# Patient Record
Sex: FEMALE | Race: ASIAN | NOT HISPANIC OR LATINO | Employment: FULL TIME | URBAN - METROPOLITAN AREA
[De-identification: names, ages, dates, MRNs, and addresses within clinical notes are randomized per-mention and may not be internally consistent; named-entity substitution may affect disease eponyms.]

---

## 2017-01-09 ENCOUNTER — PATIENT OUTREACH (OUTPATIENT)
Dept: ADMINISTRATIVE | Facility: HOSPITAL | Age: 32
End: 2017-01-09
Payer: MEDICAID

## 2017-01-23 ENCOUNTER — OFFICE VISIT (OUTPATIENT)
Dept: FAMILY MEDICINE | Facility: CLINIC | Age: 32
End: 2017-01-23
Payer: MEDICAID

## 2017-01-23 ENCOUNTER — CLINICAL SUPPORT (OUTPATIENT)
Dept: CARDIOLOGY | Facility: CLINIC | Age: 32
End: 2017-01-23
Payer: MEDICAID

## 2017-01-23 VITALS
DIASTOLIC BLOOD PRESSURE: 64 MMHG | WEIGHT: 116.5 LBS | TEMPERATURE: 99 F | SYSTOLIC BLOOD PRESSURE: 98 MMHG | BODY MASS INDEX: 21.99 KG/M2 | HEIGHT: 61 IN | HEART RATE: 80 BPM

## 2017-01-23 DIAGNOSIS — Z00.00 ROUTINE HEALTH MAINTENANCE: ICD-10-CM

## 2017-01-23 DIAGNOSIS — F43.0 STRESS REACTION: Primary | ICD-10-CM

## 2017-01-23 LAB
25(OH)D3+25(OH)D2 SERPL-MCNC: 19 NG/ML
ALBUMIN SERPL BCP-MCNC: 4.2 G/DL
ALP SERPL-CCNC: 50 U/L
ALT SERPL W/O P-5'-P-CCNC: 16 U/L
ANION GAP SERPL CALC-SCNC: 8 MMOL/L
AST SERPL-CCNC: 17 U/L
BASOPHILS # BLD AUTO: 0.06 K/UL
BASOPHILS NFR BLD: 1.1 %
BILIRUB SERPL-MCNC: 1.4 MG/DL
BILIRUB UR QL STRIP: NEGATIVE
BUN SERPL-MCNC: 10 MG/DL
CALCIUM SERPL-MCNC: 9.6 MG/DL
CHLORIDE SERPL-SCNC: 109 MMOL/L
CHOLEST/HDLC SERPL: 2.4 {RATIO}
CLARITY UR REFRACT.AUTO: CLEAR
CO2 SERPL-SCNC: 26 MMOL/L
COLOR UR AUTO: YELLOW
CREAT SERPL-MCNC: 0.8 MG/DL
DIFFERENTIAL METHOD: ABNORMAL
EOSINOPHIL # BLD AUTO: 0.2 K/UL
EOSINOPHIL NFR BLD: 2.8 %
ERYTHROCYTE [DISTWIDTH] IN BLOOD BY AUTOMATED COUNT: 11.8 %
EST. GFR  (AFRICAN AMERICAN): >60 ML/MIN/1.73 M^2
EST. GFR  (NON AFRICAN AMERICAN): >60 ML/MIN/1.73 M^2
GLUCOSE SERPL-MCNC: 96 MG/DL
GLUCOSE UR QL STRIP: NEGATIVE
HCT VFR BLD AUTO: 40.2 %
HDL/CHOLESTEROL RATIO: 42.3 %
HDLC SERPL-MCNC: 149 MG/DL
HDLC SERPL-MCNC: 63 MG/DL
HGB BLD-MCNC: 13.9 G/DL
HGB UR QL STRIP: ABNORMAL
KETONES UR QL STRIP: NEGATIVE
LDLC SERPL CALC-MCNC: 75.6 MG/DL
LEUKOCYTE ESTERASE UR QL STRIP: NEGATIVE
LYMPHOCYTES # BLD AUTO: 1.8 K/UL
LYMPHOCYTES NFR BLD: 31.9 %
MCH RBC QN AUTO: 31.3 PG
MCHC RBC AUTO-ENTMCNC: 34.6 %
MCV RBC AUTO: 91 FL
MONOCYTES # BLD AUTO: 0.3 K/UL
MONOCYTES NFR BLD: 5.3 %
NEUTROPHILS # BLD AUTO: 3.3 K/UL
NEUTROPHILS NFR BLD: 58.7 %
NITRITE UR QL STRIP: NEGATIVE
NONHDLC SERPL-MCNC: 86 MG/DL
PH UR STRIP: 6 [PH] (ref 5–8)
PLATELET # BLD AUTO: 254 K/UL
PMV BLD AUTO: 10.2 FL
POTASSIUM SERPL-SCNC: 5.3 MMOL/L
PROT SERPL-MCNC: 7.7 G/DL
PROT UR QL STRIP: ABNORMAL
RBC # BLD AUTO: 4.44 M/UL
SODIUM SERPL-SCNC: 143 MMOL/L
SP GR UR STRIP: 1.02 (ref 1–1.03)
TRIGL SERPL-MCNC: 52 MG/DL
TSH SERPL DL<=0.005 MIU/L-ACNC: 0.98 UIU/ML
URN SPEC COLLECT METH UR: ABNORMAL
UROBILINOGEN UR STRIP-ACNC: NEGATIVE EU/DL
WBC # BLD AUTO: 5.67 K/UL

## 2017-01-23 PROCEDURE — 93010 ELECTROCARDIOGRAM REPORT: CPT | Mod: S$PBB,,, | Performed by: INTERNAL MEDICINE

## 2017-01-23 PROCEDURE — 99999 PR PBB SHADOW E&M-EST. PATIENT-LVL III: CPT | Mod: PBBFAC,,, | Performed by: FAMILY MEDICINE

## 2017-01-23 PROCEDURE — 99214 OFFICE O/P EST MOD 30 MIN: CPT | Mod: S$PBB,,, | Performed by: FAMILY MEDICINE

## 2017-01-23 PROCEDURE — 93005 ELECTROCARDIOGRAM TRACING: CPT | Mod: PBBFAC,PO | Performed by: INTERNAL MEDICINE

## 2017-01-23 RX ORDER — MULTIVITAMIN
1 TABLET ORAL DAILY
COMMUNITY
End: 2018-09-10

## 2017-01-23 NOTE — PROGRESS NOTES
"Subjective:       Patient ID: Stefanie Escalante is a 31 y.o. female.    Chief Complaint: Anxiety (pt is c/o occasional pain undert left rib cage, she is attributing to stress associated with stres at home or possibly r/t working out in wrong type of bra. ); Leg Pain; Diarrhea (x4-5 days. ); skin burning (occasional feeling of burning under her skin around left hip area. ); and Back Pain (lower left back sometimes feels really tired. )    HPI   Ms. Escalante is a 30 yo female without a significant PMH who presents to clinic with several complaints.  Firstly, 3 months ago she noticed that she had rib pain after exercise.  She initially attributed it to wearing a normal bra instead of a sports bra while exercising.  The pain started out dull and under her 12th rib, but after several more days of exercise progressed to a sharp pain.  Pain has now resolved after stopping exercise.      Her other major complaint is of chest tightness.  She says it is unrelated to exercise or a specific trigger.  She notices it more when she's not busy, and it feels like "someone is squeezing her heart."  She has no h/o cardiac disease, but her mother had a stroke in her 40s.  She has taken ASA, ibuprofen, and acetaminophen with mild symptomatic improvement.  Last episode was 3 days ago. No alarm sx. Sx do not last throughout the day, relatively intermittent.    Finally, her most recent complaint is of loose stool and decreased appetite for the past 5 days.  Her loose stools have been occurring at least once per day, usually in the morning, and unrelated to eating.  She does not awaken from her sleep to stool.  She denies n/v, night sweats, fevers, weight loss, or blood in the stool.  During this time she has had a decreased appetite and has been eating a very bland diet consisting mostly of rice, crackers, and water.  She has not eaten today.    Of note, she lives at home with her 3 children, , and both parents.  Her father was diagnosed " with stage 3 lung cancer last year and has been undergoing chemotherapy.  Recently, her mother was told that she would need a knee replacement.  She also works full time in a nail salon.  Her parents do no speak english fluently, so she has been caregiver and  at all of her parents' doctor's appointments.  Additionally, she has had trouble sleeping throughout the night, as sometimes she wakes up anxious and cannot go back to sleep.  She believes that her constellation of symptoms are related to personal stress.  She is agreeable to counseling and OTC supplements for anxiety (LORETTA-e).  It was also explained to her that it is important to r/o an organic cause of these symptoms, so labs have been drawn and she will get an EKG later today.    Review of Systems   Constitutional: Positive for appetite change and fatigue. Negative for chills, fever and unexpected weight change.   HENT: Negative for congestion, ear discharge, ear pain, facial swelling, hearing loss and trouble swallowing.    Eyes: Negative for discharge and itching.   Respiratory: Positive for chest tightness. Negative for apnea, cough, choking, shortness of breath and wheezing.    Cardiovascular: Positive for chest pain. Negative for palpitations and leg swelling.   Gastrointestinal: Positive for diarrhea. Negative for abdominal distention, abdominal pain, anal bleeding, blood in stool, constipation, nausea, rectal pain and vomiting.   Endocrine: Negative for polydipsia and polyphagia.   Skin: Negative for color change and rash.   Neurological: Negative for dizziness, facial asymmetry and headaches.   Psychiatric/Behavioral: Positive for sleep disturbance. Negative for agitation, behavioral problems, confusion and self-injury. The patient is nervous/anxious.        Objective:      Physical Exam   Constitutional: She is oriented to person, place, and time. She appears well-developed and well-nourished. No distress.   HENT:   Head: Normocephalic and  atraumatic.   Right Ear: External ear normal.   Left Ear: External ear normal.   Mouth/Throat: Oropharynx is clear and moist. No oropharyngeal exudate.   Eyes: Conjunctivae and EOM are normal. Pupils are equal, round, and reactive to light. Right eye exhibits no discharge. Left eye exhibits no discharge. No scleral icterus.   Neck: Normal range of motion. Neck supple. No thyromegaly present.   Cardiovascular: Normal rate, regular rhythm, normal heart sounds and intact distal pulses.  Exam reveals no gallop and no friction rub.    No murmur heard.  Pulmonary/Chest: Effort normal and breath sounds normal. No respiratory distress. She has no wheezes. She has no rales. She exhibits no tenderness.   Abdominal: Soft. Bowel sounds are normal. She exhibits no distension and no mass. There is no tenderness. There is no rebound and no guarding.   Musculoskeletal: Normal range of motion. She exhibits no edema.   Lymphadenopathy:     She has no cervical adenopathy.   Neurological: She is alert and oriented to person, place, and time. She has normal reflexes. No cranial nerve deficit.   Skin: Skin is warm. No rash noted. She is not diaphoretic. No erythema.   Psychiatric: She has a normal mood and affect. Her behavior is normal. Thought content normal.   Nursing note and vitals reviewed.        Routine health maintenance  -     CBC auto differential; Future; Expected date: 1/23/17  -     Comprehensive metabolic panel; Future; Expected date: 1/23/17  -     Lipid panel; Future; Expected date: 1/23/17  -     TSH; Future; Expected date: 1/23/17  -     Vitamin D; Future; Expected date: 1/23/17  -     Urinalysis; Future  -     EKG 12-lead; Future    Stress reaction        - LORETTA-e OTC 400mg QD        - Recommended counseling with  or / for anxiety        - Recommended care-giver support group     The benefits, risks, and limitations of medical treatment and interventions were explained and the patient  expressed understanding.

## 2017-01-23 NOTE — MR AVS SNAPSHOT
"    [facility]  [address]  [place] [address]  Phone: [phone]  Fax: [phone]                  Stefanie Escalante   2017 10:40 AM   Office Visit    Description:  Female : 1985   Provider:  Reyna Snider MD   Department:  [facility]           Reason for Visit     Anxiety     Leg Pain     Diarrhea     skin burning     Back Pain           Diagnoses this Visit        Comments    Routine health maintenance    -  Primary     Stress reaction                To Do List           Future Appointments        Provider Department Dept Phone    2017 3:30 PM EKG, [facility] - Cardiology [phone]      Goals (5 Years of Data)     None      [facility]     [facility] On Call Nurse Care Line -  Assistance  Registered nurses in the [facility] Call Center provide clinical advisement, health education, appointment booking, and other advisory services.  Call for this free service at [phone].             Medications           Message regarding Medications     Verify the changes and/or additions to your medication regime listed below are the same as discussed with your clinician today.  If any of these changes or additions are incorrect, please notify your healthcare provider.             Verify that the below list of medications is an accurate representation of the medications you are currently taking.  If none reported, the list may be blank. If incorrect, please contact your healthcare provider. Carry this list with you in case of emergency.           Current Medications     multivitamin (ONE DAILY MULTIVITAMIN) per tablet Take 1 tablet by mouth once daily.           Clinical Reference Information           Vital Signs - Last Recorded  Most recent update: 2017 10:30 AM by Debora Roe LPN    BP Pulse Temp Ht Wt LMP    98/64 (BP Location: Right arm, Patient Position: Sitting, BP Method: Manual) 80 99 °F (37.2 °C) 5' 1" (1.549 " m) 52.9 kg (116 lb 8.2 oz) 01/05/2017    BMI                22.01 kg/m2          Blood Pressure          Most Recent Value    BP  98/64      Allergies as of 1/23/2017     Latex      Immunizations Administered on Date of Encounter - 1/23/2017     None      Orders Placed During Today's Visit     Future Labs/Procedures Expected by Expires    CBC auto differential  1/23/2017 3/24/2018    Comprehensive metabolic panel  1/23/2017 3/24/2018    Lipid panel  1/23/2017 3/24/2018    TSH  1/23/2017 3/24/2018    Vitamin D  1/23/2017 3/24/2018    EKG 12-lead  As directed 1/24/2018    Urinalysis  As directed 1/23/2018

## 2017-01-25 ENCOUNTER — PATIENT MESSAGE (OUTPATIENT)
Dept: FAMILY MEDICINE | Facility: CLINIC | Age: 32
End: 2017-01-25

## 2017-02-07 ENCOUNTER — PATIENT MESSAGE (OUTPATIENT)
Dept: FAMILY MEDICINE | Facility: CLINIC | Age: 32
End: 2017-02-07

## 2017-02-07 NOTE — TELEPHONE ENCOUNTER
"It's been about 2 weeks since I last saw you so I just wanted to give you an update. The lump in my throat feeling has gone away. The tightness in my chest is somewhat still there. It feels like someone is squeezing my heart or lung but it's not as often as it was. My lower back pain is still feeling tired and it sometimes runs down my left thigh. I've tried icing it and massaging it and sometimes it helps and other times it doesn't. My gut still has pings here and there and my bowel movements is sometimes okay and other times it's diarrhea. I feel like I might have IBS or something to that sort because there are times I eat and have to go soon after and other times I wake up and haven't ate and need to go to the bathroom.  I'm still taking the vitamin D and the LORETTA-E but I haven't found that LORETTA-E is really helping but my mood is still the same (maybe I just need to take it a little longer). I still feel like I'm in a rut or in a funk. I'm not as "depressed" as before because I have   to talked to a  and I felt better but I still feel "blah" at times. I haven't forgotten about my urine test and will be in soon for that when I have the time. I'm just so busy my mother just had a knee replacement. If there's anything else I need to do please advise and thanks.   "

## 2017-03-07 ENCOUNTER — PATIENT MESSAGE (OUTPATIENT)
Dept: FAMILY MEDICINE | Facility: CLINIC | Age: 32
End: 2017-03-07

## 2017-03-07 NOTE — TELEPHONE ENCOUNTER
When I last saw you I spoke about a slight pain under my left rib cage. It started off with me working out without the proper workout bra and I thought it was the underwire of the bra that caused the slight pain and changed to a workout bra without an underwire. I notice it sometimes when I'm sitting slouched or bending to my left side and the pain would be sharp. The other day I was bending over the sink to brush my teeth and it pinged. When you touch the area around the ribs it doesn't hurt at all. The pain isn't worse or happening more often it's just there. Pain is probably on a scale from 1-10 it's a 2-3. Is there anything I can do about it since it's been months now? I think the [place] is starting to work also I have been feeling a little better this past week and feeling almost like my old self again.

## 2017-03-28 ENCOUNTER — PATIENT MESSAGE (OUTPATIENT)
Dept: FAMILY MEDICINE | Facility: CLINIC | Age: 32
End: 2017-03-28

## 2017-03-28 DIAGNOSIS — R07.81 RIB PAIN: Primary | ICD-10-CM

## 2017-03-28 NOTE — TELEPHONE ENCOUNTER
I have been taking the Aleve  and it hasn't gotten better but now actually hurts worse and somewhat swollen. I also showed my  and he can see it also. The pain is probably a 3-4.The pain is still by my left rib cage. If you do put in an order for an x-ray can you also put in for the urinalysis since I'm also due for it. Thanks.

## 2017-03-30 ENCOUNTER — HOSPITAL ENCOUNTER (OUTPATIENT)
Dept: RADIOLOGY | Facility: HOSPITAL | Age: 32
Discharge: HOME | End: 2017-03-30
Attending: FAMILY MEDICINE
Payer: MEDICAID

## 2017-03-30 DIAGNOSIS — R07.81 RIB PAIN: ICD-10-CM

## 2017-03-30 PROCEDURE — 71100 X-RAY EXAM RIBS UNI 2 VIEWS: CPT | Mod: TC,PO

## 2017-03-30 PROCEDURE — 71100 X-RAY EXAM RIBS UNI 2 VIEWS: CPT | Mod: 26,,, | Performed by: RADIOLOGY

## 2017-05-22 ENCOUNTER — OFFICE VISIT (OUTPATIENT)
Dept: FAMILY MEDICINE | Facility: CLINIC | Age: 32
End: 2017-05-22
Payer: MEDICAID

## 2017-05-22 VITALS
HEART RATE: 72 BPM | WEIGHT: 122.81 LBS | DIASTOLIC BLOOD PRESSURE: 80 MMHG | OXYGEN SATURATION: 98 % | TEMPERATURE: 99 F | HEIGHT: 61 IN | SYSTOLIC BLOOD PRESSURE: 110 MMHG | BODY MASS INDEX: 23.19 KG/M2

## 2017-05-22 DIAGNOSIS — B96.89 ACUTE BACTERIAL BRONCHITIS: Primary | ICD-10-CM

## 2017-05-22 DIAGNOSIS — J20.8 ACUTE BACTERIAL BRONCHITIS: Primary | ICD-10-CM

## 2017-05-22 DIAGNOSIS — J02.9 PHARYNGITIS, UNSPECIFIED ETIOLOGY: ICD-10-CM

## 2017-05-22 LAB
CTP QC/QA: YES
S PYO RRNA THROAT QL PROBE: NEGATIVE

## 2017-05-22 PROCEDURE — 87880 STREP A ASSAY W/OPTIC: CPT | Mod: PBBFAC,PO | Performed by: NURSE PRACTITIONER

## 2017-05-22 PROCEDURE — 99999 PR PBB SHADOW E&M-EST. PATIENT-LVL IV: CPT | Mod: PBBFAC,,, | Performed by: NURSE PRACTITIONER

## 2017-05-22 PROCEDURE — 99213 OFFICE O/P EST LOW 20 MIN: CPT | Mod: S$PBB,,, | Performed by: NURSE PRACTITIONER

## 2017-05-22 PROCEDURE — 99214 OFFICE O/P EST MOD 30 MIN: CPT | Mod: PBBFAC,PO | Performed by: NURSE PRACTITIONER

## 2017-05-22 PROCEDURE — 87081 CULTURE SCREEN ONLY: CPT

## 2017-05-22 RX ORDER — AZITHROMYCIN 250 MG/1
TABLET, FILM COATED ORAL
Qty: 6 TABLET | Refills: 0 | Status: SHIPPED | OUTPATIENT
Start: 2017-05-22 | End: 2017-05-27

## 2017-05-22 NOTE — PROGRESS NOTES
Subjective:       Patient ID: Stefanie Escalante is a 32 y.o. female.    Chief Complaint: Cough (x 2 weeks) and tightness in throat    HPI     Patient presents to clinic with complaints of a cough.   Sx started with a sore throat, which improved - still notes persistent tightness. Denies difficulty swallowing, breathing.   + rhinorrhea. Cough is primarily dry, occasionally productive with yellow-green sputum.   Denies fever, chills, night sweats.   She has been taking sudafed, nyquil, flonase, mucinex, and zyrtec with some relief.   Denies hx of asthma. She is not a smoker.     Review of Systems   Constitutional: Negative for chills and fever.   HENT: Positive for postnasal drip and sore throat. Negative for ear pain, rhinorrhea and trouble swallowing.    Respiratory: Positive for cough and chest tightness. Negative for shortness of breath and wheezing.    Musculoskeletal: Negative for myalgias and neck pain.   Skin: Negative for rash.   Allergic/Immunologic: Negative for environmental allergies and food allergies.   Neurological: Negative for dizziness and headaches.       Objective:      Physical Exam   Constitutional: She is oriented to person, place, and time. She appears well-developed and well-nourished.   HENT:   Head: Normocephalic and atraumatic.   Right Ear: Tympanic membrane is not erythematous. A middle ear effusion is present.   Left Ear: Tympanic membrane is not erythematous. A middle ear effusion is present.   Nose: Mucosal edema and rhinorrhea present. Right sinus exhibits no maxillary sinus tenderness and no frontal sinus tenderness. Left sinus exhibits no maxillary sinus tenderness and no frontal sinus tenderness.   Mouth/Throat: Uvula is midline and mucous membranes are normal. No oropharyngeal exudate or posterior oropharyngeal erythema.   Eyes: Pupils are equal, round, and reactive to light. Right eye exhibits no discharge. Left eye exhibits no discharge.   Neck: Normal range of motion. Neck supple.    Cardiovascular: Normal rate, regular rhythm and normal heart sounds.    Pulmonary/Chest: Breath sounds normal. No respiratory distress. She has no wheezes. She has no rales.   Musculoskeletal: Normal range of motion. She exhibits no edema.   Neurological: She is alert and oriented to person, place, and time. No cranial nerve deficit. Coordination normal.   Skin: Skin is warm and dry. No rash noted. No erythema.   Psychiatric: She has a normal mood and affect. Her behavior is normal.   Nursing note and vitals reviewed.      Assessment:       1. Acute bacterial bronchitis    2. Pharyngitis, unspecified etiology        Plan:   Stefanie was seen today for cough and tightness in throat.    Diagnoses and all orders for this visit:    Acute bacterial bronchitis  -     POCT Rapid Strep A - negative   -     Strep A culture, throat  -     azithromycin (Z-BIA) 250 MG tablet; Take 2 tablets by mouth on day 1; Take 1 tablet by mouth on days 2-5   Side effects and precautions of medication use reviewed with patient, expressed understanding. No questions or concerns.  Expectant management reviewed: increase fluid intake, otc analgesics prn, mucinex and antihistamines for sx relief. Call if sx persist or worsen.     Pharyngitis, unspecified etiology  -     POCT Rapid Strep A  -     Strep A culture, throat  -     azithromycin (Z-BIA) 250 MG tablet; Take 2 tablets by mouth on day 1; Take 1 tablet by mouth on days 2-5

## 2017-05-24 LAB — BACTERIA THROAT CULT: NORMAL

## 2017-05-31 ENCOUNTER — PATIENT MESSAGE (OUTPATIENT)
Dept: FAMILY MEDICINE | Facility: CLINIC | Age: 32
End: 2017-05-31

## 2017-05-31 NOTE — TELEPHONE ENCOUNTER
It is completely normal for a cough to linger for some time after an acute URI. She should manage the sx and RTC if sx worsen or fever develops.

## 2017-11-03 ENCOUNTER — OFFICE VISIT (OUTPATIENT)
Dept: FAMILY MEDICINE | Facility: CLINIC | Age: 32
End: 2017-11-03
Payer: MEDICAID

## 2017-11-03 VITALS
DIASTOLIC BLOOD PRESSURE: 84 MMHG | WEIGHT: 128 LBS | HEART RATE: 65 BPM | HEIGHT: 61 IN | OXYGEN SATURATION: 99 % | SYSTOLIC BLOOD PRESSURE: 112 MMHG | BODY MASS INDEX: 24.17 KG/M2 | TEMPERATURE: 99 F | RESPIRATION RATE: 16 BRPM

## 2017-11-03 DIAGNOSIS — J06.9 UPPER RESPIRATORY TRACT INFECTION, UNSPECIFIED TYPE: Primary | ICD-10-CM

## 2017-11-03 PROCEDURE — 99999 PR PBB SHADOW E&M-EST. PATIENT-LVL III: CPT | Mod: PBBFAC,,, | Performed by: FAMILY MEDICINE

## 2017-11-03 PROCEDURE — 99213 OFFICE O/P EST LOW 20 MIN: CPT | Mod: PBBFAC,PN | Performed by: FAMILY MEDICINE

## 2017-11-03 PROCEDURE — 99213 OFFICE O/P EST LOW 20 MIN: CPT | Mod: S$PBB,,, | Performed by: FAMILY MEDICINE

## 2017-11-03 RX ORDER — GUAIFENESIN 600 MG/1
1200 TABLET, EXTENDED RELEASE ORAL 2 TIMES DAILY
COMMUNITY
End: 2018-09-10

## 2017-11-03 RX ORDER — BENZONATATE 100 MG/1
100 CAPSULE ORAL 3 TIMES DAILY PRN
Qty: 30 CAPSULE | Refills: 0 | Status: SHIPPED | OUTPATIENT
Start: 2017-11-03 | End: 2017-11-13

## 2017-11-03 RX ORDER — ALBUTEROL SULFATE 90 UG/1
2 AEROSOL, METERED RESPIRATORY (INHALATION) EVERY 6 HOURS PRN
Qty: 18 G | Refills: 3 | Status: SHIPPED | OUTPATIENT
Start: 2017-11-03 | End: 2018-09-10

## 2017-11-03 NOTE — PROGRESS NOTES
"Subjective:       Patient ID: Stefanie Escalante is a 32 y.o. female.    Chief Complaint: Cough (x 2 weeks began as productive w green mucous but now clear mucous) and Headache (pt states chest congestion and "heaviness"feeling)    HPI   Cough x 2 weeks, afebrile. Initially with green mucous, since has cleared. Primarily a dry cough with some chest tightness associated. Sore throat as well.     Review of Systems   Constitutional: Negative for chills and fever.   HENT: Positive for postnasal drip and sore throat. Negative for ear pain and rhinorrhea.    Respiratory: Positive for cough and chest tightness. Negative for shortness of breath and wheezing.    Cardiovascular: Positive for chest pain (under L rib line with coughing). Negative for palpitations.   Musculoskeletal: Negative for myalgias.   Skin: Negative for rash.   Allergic/Immunologic: Negative for environmental allergies.   Neurological: Negative for dizziness and headaches.       Objective:      Physical Exam   Constitutional: She is oriented to person, place, and time. She appears well-developed and well-nourished. No distress.   HENT:   Head: Normocephalic and atraumatic.   Right Ear: External ear normal. Tympanic membrane is not erythematous. A middle ear effusion is present.   Left Ear: External ear normal. Tympanic membrane is not erythematous. A middle ear effusion is present.   Nose: Mucosal edema and rhinorrhea present.   Mouth/Throat: Posterior oropharyngeal erythema present. No oropharyngeal exudate.   Eyes: Conjunctivae and EOM are normal. Pupils are equal, round, and reactive to light. Right eye exhibits no discharge. Left eye exhibits no discharge.   Neck: Normal range of motion. Neck supple.   Cardiovascular: Normal rate and regular rhythm.    Pulmonary/Chest: Effort normal. No respiratory distress. She has no wheezes. She has no rales. She exhibits no tenderness.   Bronchial breath sounds   Abdominal: Soft. Bowel sounds are normal. She exhibits " no distension. There is no tenderness.   Lymphadenopathy:     She has no cervical adenopathy.   Neurological: She is alert and oriented to person, place, and time.   Skin: Skin is warm and dry.   Nursing note and vitals reviewed.        Upper respiratory tract infection, unspecified type    Other orders  -     albuterol 90 mcg/actuation inhaler; Inhale 2 puffs into the lungs every 6 (six) hours as needed.  Dispense: 18 g; Refill: 3  -     benzonatate (TESSALON) 100 MG capsule; Take 1 capsule (100 mg total) by mouth 3 (three) times daily as needed.  Dispense: 30 capsule; Refill: 0    Side effects and precautions of medication use reviewed with patient, expressed understanding. No questions or concerns. Expected course of illness and sx tx incl otc med use reviewed. Notify MD if sx persist or worsen.

## 2018-07-09 ENCOUNTER — TELEPHONE (OUTPATIENT)
Dept: FAMILY MEDICINE | Facility: CLINIC | Age: 33
End: 2018-07-09

## 2018-07-09 NOTE — TELEPHONE ENCOUNTER
----- Message from Trinh Mcnulty sent at 7/9/2018  8:18 AM CDT -----  Type:  Sooner Apoointment Request    Caller is requesting a sooner appointment.  Caller declined first available appointment listed below.  Caller will not accept being placed on the waitlist and is requesting a message be sent to doctor.    Name of Caller: patient  When is the first available appointment?  8/9  Symptoms:  Neck swelling and itching  Best Call Back Number:  [phone] (home)     Additional Information:  Stating saw her chiropractor Friday, massaged her neck with cooling gel and gave her allergic reaction. Stating going back today but not sure if he can prescribe her anything. Wanted to be seen tomorrow

## 2018-07-11 ENCOUNTER — OFFICE VISIT (OUTPATIENT)
Dept: FAMILY MEDICINE | Facility: CLINIC | Age: 33
End: 2018-07-11
Payer: MEDICAID

## 2018-07-11 VITALS
HEART RATE: 77 BPM | BODY MASS INDEX: 22.37 KG/M2 | TEMPERATURE: 99 F | OXYGEN SATURATION: 98 % | DIASTOLIC BLOOD PRESSURE: 70 MMHG | WEIGHT: 118.5 LBS | SYSTOLIC BLOOD PRESSURE: 120 MMHG | HEIGHT: 61 IN

## 2018-07-11 DIAGNOSIS — L23.3 ALLERGIC CONTACT DERMATITIS DUE TO DRUGS IN CONTACT WITH SKIN: Primary | ICD-10-CM

## 2018-07-11 PROCEDURE — 99213 OFFICE O/P EST LOW 20 MIN: CPT | Mod: S$PBB,,, | Performed by: FAMILY MEDICINE

## 2018-07-11 PROCEDURE — 99999 PR PBB SHADOW E&M-EST. PATIENT-LVL III: CPT | Mod: PBBFAC,,, | Performed by: FAMILY MEDICINE

## 2018-07-11 PROCEDURE — 99213 OFFICE O/P EST LOW 20 MIN: CPT | Mod: PBBFAC,PN | Performed by: FAMILY MEDICINE

## 2018-07-11 RX ORDER — METHYLPREDNISOLONE 4 MG/1
TABLET ORAL
Qty: 21 TABLET | Refills: 0 | Status: SHIPPED | OUTPATIENT
Start: 2018-07-11 | End: 2018-08-01

## 2018-07-11 RX ORDER — HYDROXYZINE HYDROCHLORIDE 25 MG/1
25 TABLET, FILM COATED ORAL 3 TIMES DAILY
Qty: 20 TABLET | Refills: 0 | Status: SHIPPED | OUTPATIENT
Start: 2018-07-11 | End: 2018-09-10

## 2018-07-11 RX ORDER — TRIAMCINOLONE ACETONIDE 1 MG/G
CREAM TOPICAL 2 TIMES DAILY
Qty: 45 G | Refills: 1 | Status: SHIPPED | OUTPATIENT
Start: 2018-07-11 | End: 2018-09-10

## 2018-07-11 NOTE — PROGRESS NOTES
Subjective:       Patient ID: Stefanie Escalante is a 33 y.o. female.    Chief Complaint: Rash    HPI  Patient with c/o rash to posterior neck, ears, lower back. Substance was used by chiropractor, and contains menthol, msm, arnica, boswella, and several other compounds. Pt has a known hx of latex allergy (rash). She notes that the rash is stable in area affected, but very warm to touch, and hypersensitive. She reports afebrile, no upper respiratory changes or concerns reported.    Review of Systems   Constitutional: Negative for fatigue, fever and unexpected weight change.   HENT: Negative for facial swelling and trouble swallowing.    Skin: Positive for rash. Negative for pallor and wound.       Objective:      Physical Exam   Constitutional: She is oriented to person, place, and time. She appears well-developed and well-nourished. No distress.   HENT:   Head: Normocephalic and atraumatic.   Eyes: Conjunctivae are normal. Right eye exhibits no discharge. Left eye exhibits no discharge. No scleral icterus.   Neck: Normal range of motion. Neck supple.   Cardiovascular: Normal rate.    Pulmonary/Chest: Effort normal. No respiratory distress.   Abdominal: Soft. She exhibits no distension.   Musculoskeletal: Normal range of motion. She exhibits no edema.   Neurological: She is alert and oriented to person, place, and time.   Skin: Skin is warm and dry. Rash (ertyhematous rash, no vesicles) noted.        Psychiatric: She has a normal mood and affect. Her behavior is normal.   Nursing note and vitals reviewed.        Allergic contact dermatitis due to drugs in contact with skin  -     methylPREDNISolone (MEDROL DOSEPACK) 4 mg tablet; Take as directed  Dispense: 21 tablet; Refill: 0  -     triamcinolone acetonide 0.1% (KENALOG) 0.1 % cream; Apply topically 2 (two) times daily. for 10 days  Dispense: 45 g; Refill: 1  -     hydrOXYzine HCl (ATARAX) 25 MG tablet; Take 1 tablet (25 mg total) by mouth 3 (three) times daily.   Dispense: 20 tablet; Refill: 0  Side effects and precautions of medication use reviewed with patient, expressed understanding. No questions or concerns. Expected course of illness and sx tx incl otc med use reviewed. Notify MD if sx persist or worsen.

## 2018-07-23 ENCOUNTER — PATIENT MESSAGE (OUTPATIENT)
Dept: FAMILY MEDICINE | Facility: CLINIC | Age: 33
End: 2018-07-23

## 2018-09-10 ENCOUNTER — OFFICE VISIT (OUTPATIENT)
Dept: OBSTETRICS AND GYNECOLOGY | Facility: CLINIC | Age: 33
End: 2018-09-10
Payer: MEDICAID

## 2018-09-10 VITALS
HEIGHT: 61 IN | BODY MASS INDEX: 21.23 KG/M2 | SYSTOLIC BLOOD PRESSURE: 98 MMHG | DIASTOLIC BLOOD PRESSURE: 60 MMHG | WEIGHT: 112.44 LBS

## 2018-09-10 DIAGNOSIS — Z12.4 PAP SMEAR FOR CERVICAL CANCER SCREENING: ICD-10-CM

## 2018-09-10 DIAGNOSIS — Z01.419 VISIT FOR GYNECOLOGIC EXAMINATION: Primary | ICD-10-CM

## 2018-09-10 DIAGNOSIS — Z30.8 ENCOUNTER FOR OTHER CONTRACEPTIVE MANAGEMENT: ICD-10-CM

## 2018-09-10 PROCEDURE — 87624 HPV HI-RISK TYP POOLED RSLT: CPT

## 2018-09-10 PROCEDURE — 88175 CYTOPATH C/V AUTO FLUID REDO: CPT

## 2018-09-10 PROCEDURE — 99395 PREV VISIT EST AGE 18-39: CPT | Mod: S$PBB,,, | Performed by: OBSTETRICS & GYNECOLOGY

## 2018-09-10 PROCEDURE — 99999 PR PBB SHADOW E&M-EST. PATIENT-LVL III: CPT | Mod: PBBFAC,,, | Performed by: OBSTETRICS & GYNECOLOGY

## 2018-09-10 PROCEDURE — 99213 OFFICE O/P EST LOW 20 MIN: CPT | Mod: PBBFAC,PN | Performed by: OBSTETRICS & GYNECOLOGY

## 2018-09-10 NOTE — PROGRESS NOTES
Chief Complaint   Patient presents with    Well Woman       History and Physical:  Patient's last menstrual period was 2018 (exact date).       Stefanie Escalante is a 33 y.o.  Female who presents today for her routine annual GYN exam. The patient has no Gynecology complaints today. No bowel or bladder complaints. No pelvic pains      Allergies:   Review of patient's allergies indicates:   Allergen Reactions    Latex Rash       Past Medical History:   Diagnosis Date    Ovarian cyst        Past Surgical History:   Procedure Laterality Date    CHOLECYSTECTOMY      cholescystectomy      ovarian tumor removal         MEDS:   Current Outpatient Medications on File Prior to Visit   Medication Sig Dispense Refill    [DISCONTINUED] albuterol 90 mcg/actuation inhaler Inhale 2 puffs into the lungs every 6 (six) hours as needed. 18 g 3    [DISCONTINUED] DM/P-EPHED/ACETAMINOPH/DOXYLAM (NYQUIL ORAL) Take by mouth.      [DISCONTINUED] guaiFENesin (MUCINEX) 600 mg 12 hr tablet Take 1,200 mg by mouth 2 (two) times daily.      [DISCONTINUED] hydrOXYzine HCl (ATARAX) 25 MG tablet Take 1 tablet (25 mg total) by mouth 3 (three) times daily. 20 tablet 0    [DISCONTINUED] multivitamin (ONE DAILY MULTIVITAMIN) per tablet Take 1 tablet by mouth once daily.      [DISCONTINUED] triamcinolone acetonide 0.1% (KENALOG) 0.1 % cream Apply topically 2 (two) times daily. for 10 days 45 g 1     No current facility-administered medications on file prior to visit.        OB History      Para Term  AB Living    3 2            SAB TAB Ectopic Multiple Live Births                       Social History     Socioeconomic History    Marital status:      Spouse name: Not on file    Number of children: Not on file    Years of education: Not on file    Highest education level: Not on file   Social Needs    Financial resource strain: Not on file    Food insecurity - worry: Not on file    Food insecurity - inability:  "Not on file    Transportation needs - medical: Not on file    Transportation needs - non-medical: Not on file   Occupational History    Not on file   Tobacco Use    Smoking status: Never Smoker    Smokeless tobacco: Never Used   Substance and Sexual Activity    Alcohol use: No     Comment: rarely    Drug use: No    Sexual activity: Yes     Partners: Male     Birth control/protection: Partner-Vasectomy   Other Topics Concern    Not on file   Social History Narrative    Not on file       Family History   Problem Relation Age of Onset    Cancer Father         lung ca     Breast cancer Neg Hx     Ovarian cancer Neg Hx          Past medical and surgical history reviewed.   I have reviewed the patient's medical history in detail and updated the computerized patient record.        Review of System:   General: no chills, fever, night sweats, weight gain or weight loss  Psychological: no depression or suicidal ideation  Breasts: no new or changing breast lumps, nipple discharge or masses.  Respiratory: no cough, shortness of breath, or wheezing  Cardiovascular: no chest pain or dyspnea on exertion  Gastrointestinal: no abdominal pain, change in bowel habits, or black or bloody stools  Genito-Urinary: no incontinence, urinary frequency/urgency or vulvar/vaginal symptoms, pelvic pain or abnormal vaginal bleeding.  Musculoskeletal: no gait disturbance or muscular weakness      Physical Exam:   BP 98/60   Ht 5' 1" (1.549 m)   Wt 51 kg (112 lb 7 oz)   LMP 08/30/2018 (Exact Date)   Breastfeeding? No   BMI 21.24 kg/m²   Constitutional: She is oriented to person, place, and time. She appears well-developed and well-nourished. No distress.   HENT:   Head: Normocephalic and atraumatic.   Eyes: Conjunctivae and EOM are normal. No scleral icterus.   Neck: Normal range of motion. Neck supple. No tracheal deviation present.   Cardiovascular: Normal rate.    Pulmonary/Chest: Effort normal. No respiratory distress. She " exhibits no tenderness.  Breasts: are symmetrical.   Right breast exhibits no inverted nipple, no mass, no nipple discharge, no skin change and no tenderness.   Left breast exhibits no inverted nipple, no mass, no nipple discharge, no skin change and no tenderness.  Abdominal: Soft. She exhibits no distension and no mass. There is no tenderness. There is no rebound and no guarding.   Genitourinary:    External rectal exam shows no thrombosed external hemorrhoids.    Pelvic exam was performed with patient supine.   No labial fusion.   There is no rash, lesion or injury on the right labia.   There is no rash, lesion or injury on the left labia.   No bleeding and no signs of injury around the vaginal introitus, urethra is without lesions and well supported. The cervix is visualized with no discharge, lesions or friability.   No vaginal discharge found.    No significant Cystocele, Enterocele or rectocele, and uterus well supported.   Bimanual exam:   The urethra is normal to palpation and there are no palpable vaginal wall masses.   Uterus is not deviated, not enlarged, not fixed, normal shape and not tender.   Cervix exhibits no motion tenderness.    Right adnexum displays no mass and no tenderness.   Left adnexum displays no mass and no tenderness.  Musculoskeletal: Normal range of motion.   Lymphadenopathy: No inguinal adenopathy present.   Neurological: She is alert and oriented to person, place, and time. Coordination normal.   Skin: Skin is warm and dry. She is not diaphoretic.   Psychiatric: She has a normal mood and affect.      Assessment:   Normal annual GYN exam  1. Visit for gynecologic examination     2. Pap smear for cervical cancer screening  Liquid-based pap smear, screening    HPV High Risk Genotypes, PCR   vasectomy for Birth control     Plan:   PAP  Mammogram at 39  Follow up in 1 year.  Patient informed will be contacted with results within 2 weeks. Encouraged to please call back or email if she has  not heard from us by then.

## 2018-09-13 LAB
HPV HR 12 DNA CVX QL NAA+PROBE: NEGATIVE
HPV16 AG SPEC QL: NEGATIVE
HPV18 DNA SPEC QL NAA+PROBE: NEGATIVE

## 2018-11-20 ENCOUNTER — PATIENT MESSAGE (OUTPATIENT)
Dept: OBSTETRICS AND GYNECOLOGY | Facility: CLINIC | Age: 33
End: 2018-11-20

## 2018-11-23 ENCOUNTER — HOSPITAL ENCOUNTER (OUTPATIENT)
Dept: RADIOLOGY | Facility: HOSPITAL | Age: 33
Discharge: HOME | End: 2018-11-23
Attending: OBSTETRICS & GYNECOLOGY
Payer: MEDICAID

## 2018-11-23 ENCOUNTER — OFFICE VISIT (OUTPATIENT)
Dept: OBSTETRICS AND GYNECOLOGY | Facility: CLINIC | Age: 33
End: 2018-11-23
Payer: MEDICAID

## 2018-11-23 VITALS
WEIGHT: 115.94 LBS | SYSTOLIC BLOOD PRESSURE: 108 MMHG | DIASTOLIC BLOOD PRESSURE: 62 MMHG | BODY MASS INDEX: 21.91 KG/M2

## 2018-11-23 DIAGNOSIS — R10.32 LLQ ABDOMINAL PAIN: Primary | ICD-10-CM

## 2018-11-23 DIAGNOSIS — R10.32 LLQ ABDOMINAL PAIN: ICD-10-CM

## 2018-11-23 PROCEDURE — 76830 TRANSVAGINAL US NON-OB: CPT | Mod: 26,,, | Performed by: RADIOLOGY

## 2018-11-23 PROCEDURE — 99213 OFFICE O/P EST LOW 20 MIN: CPT | Mod: PBBFAC,PN,25 | Performed by: OBSTETRICS & GYNECOLOGY

## 2018-11-23 PROCEDURE — 76856 US EXAM PELVIC COMPLETE: CPT | Mod: 26,,, | Performed by: RADIOLOGY

## 2018-11-23 PROCEDURE — 99999 PR PBB SHADOW E&M-EST. PATIENT-LVL III: CPT | Mod: PBBFAC,,, | Performed by: OBSTETRICS & GYNECOLOGY

## 2018-11-23 PROCEDURE — 99213 OFFICE O/P EST LOW 20 MIN: CPT | Mod: S$PBB,,, | Performed by: OBSTETRICS & GYNECOLOGY

## 2018-11-23 PROCEDURE — 76856 US EXAM PELVIC COMPLETE: CPT | Mod: TC,PN

## 2018-11-23 NOTE — PROGRESS NOTES
Chief Complaint   Patient presents with    Pelvic Pain     left side, x's 1 week       History of Present Illness   33 y.o.  Female patient presents today for 1 week burning pains in LLQ,no abnormal bleeding, menses 2 weeks ago.    Past medical and surgical history reviewed.   I have reviewed the patient's medical history in detail and updated the computerized patient record.    Review of patient's allergies indicates:   Allergen Reactions    Latex Rash         Review of Systems - Negative except HPI  GEN ROS: negative for - chills or fever  Breast ROS: negative for breast lumps  Genito-Urinary ROS: no dysuria, trouble voiding, or hematuria      Physical Examination:  /62   Wt 52.6 kg (115 lb 15.4 oz)   LMP 11/10/2018 (Exact Date)   BMI 21.91 kg/m²    Constitutional: She appears alert and responsive. She appears well-developed, well-groomed, and well-nourished. No distress. Thin  HENT:   Head: Normocephalic and atraumatic.   Eyes: Conjunctivae and EOM are normal. No scleral icterus.   Neck: Symmetrical. Normal range of motion. Neck supple. No tracheal deviation present. THYROID: without masses or tenderness.  Cardiovascular: Normal rate, no rhythm abnormality noted. Extremities without swelling or edema, warm.    Pulmonary/Chest: Normal respiratory Effort. No distress or retractions. She exhibits no tenderness.  Abdominal: Soft. She exhibits no distension, hernias or masses. There is no tenderness. No enlargement of liver edge or spleen.  There is no rebound and no guarding.   Genitourinary:    External rectal exam shows no thrombosed external hemorrhoids, no lesions.     Pelvic exam was performed with patient supine.   No labial fusion, and symmetrical.    There is no rash, lesion or injury on the right labia.   There is no rash, lesion or injury on the left labia.   No bleeding and no signs of injury around the vaginal introitus, urethral meatus is normal size and without prolapse or lesions,  urethra well supported. The cervix is visualized with no discharge, lesions or friability.   No vaginal discharge found.    No significant Cystocele, Enterocele or rectocele, and cervix and uterus well supported.   Bimanual exam:   The urethra is normal to palpation and there are no palpable vaginal wall masses.   Uterus is not deviated, not enlarged, not fixed, normal shape and not tender.   Cervix exhibits no motion tenderness.    Right adnexum displays n]fullenss but no nodularity and no tenderness.   Left adnexum displays no mass or nodularity and mild tenderness.  Musculoskeletal: Normal range of motion.   Lymphadenopathy: No inguinal adenopathy present.   Neurological: She is alert and oriented to person, place, and time. Coordination normal.   Skin: Skin is warm and dry. She is not diaphoretic. No rashes, lesions or ulcers.   Psychiatric: She has a normal mood and affect, oriented to person, place, and time.              Assessment:  H/o right teratoma- excised  1. LLQ abdominal pain  US Pelvis Complete Non OB       Plan:  Pelvic ultrasound- rule out mass  Watchful waiting and avoid NSAIDS for now.   Patient informed will be contacted with results within 2 weeks. Encouraged to please call back or email if she has not heard from us by then.

## 2018-11-26 ENCOUNTER — PATIENT MESSAGE (OUTPATIENT)
Dept: OBSTETRICS AND GYNECOLOGY | Facility: CLINIC | Age: 33
End: 2018-11-26

## 2018-12-03 ENCOUNTER — OFFICE VISIT (OUTPATIENT)
Dept: FAMILY MEDICINE | Facility: CLINIC | Age: 33
End: 2018-12-03
Payer: MEDICAID

## 2018-12-03 VITALS
TEMPERATURE: 98 F | BODY MASS INDEX: 21.69 KG/M2 | WEIGHT: 114.88 LBS | SYSTOLIC BLOOD PRESSURE: 90 MMHG | HEIGHT: 61 IN | OXYGEN SATURATION: 99 % | DIASTOLIC BLOOD PRESSURE: 64 MMHG | HEART RATE: 78 BPM

## 2018-12-03 DIAGNOSIS — R10.32 LLQ ABDOMINAL PAIN: Primary | ICD-10-CM

## 2018-12-03 DIAGNOSIS — N39.0 URINARY TRACT INFECTION WITHOUT HEMATURIA, SITE UNSPECIFIED: ICD-10-CM

## 2018-12-03 LAB
BILIRUB SERPL-MCNC: NEGATIVE MG/DL
BLOOD URINE, POC: NORMAL
COLOR, POC UA: YELLOW
GLUCOSE UR QL STRIP: NORMAL
KETONES UR QL STRIP: NEGATIVE
LEUKOCYTE ESTERASE URINE, POC: NORMAL
NITRITE, POC UA: NEGATIVE
PH, POC UA: 6
PROTEIN, POC: NEGATIVE
SPECIFIC GRAVITY, POC UA: 1.02
UROBILINOGEN, POC UA: NORMAL

## 2018-12-03 PROCEDURE — 99214 OFFICE O/P EST MOD 30 MIN: CPT | Mod: PBBFAC,PN | Performed by: NURSE PRACTITIONER

## 2018-12-03 PROCEDURE — 87086 URINE CULTURE/COLONY COUNT: CPT

## 2018-12-03 PROCEDURE — 99999 PR PBB SHADOW E&M-EST. PATIENT-LVL IV: CPT | Mod: PBBFAC,,, | Performed by: NURSE PRACTITIONER

## 2018-12-03 PROCEDURE — 99215 OFFICE O/P EST HI 40 MIN: CPT | Mod: 25,S$PBB,, | Performed by: NURSE PRACTITIONER

## 2018-12-03 PROCEDURE — 81002 URINALYSIS NONAUTO W/O SCOPE: CPT | Mod: PBBFAC,PN | Performed by: NURSE PRACTITIONER

## 2018-12-03 RX ORDER — SULFAMETHOXAZOLE AND TRIMETHOPRIM 800; 160 MG/1; MG/1
1 TABLET ORAL 2 TIMES DAILY
Qty: 14 TABLET | Refills: 0 | Status: SHIPPED | OUTPATIENT
Start: 2018-12-03 | End: 2018-12-10

## 2018-12-03 NOTE — PROGRESS NOTES
This dictation has been generated using Modal Fluency Dictation some phonetic errors may occur. Please contact author for clarification if needed.     Problem List Items Addressed This Visit     None      Visit Diagnoses     LLQ abdominal pain    -  Primary    Relevant Orders    POCT URINE DIPSTICK WITHOUT MICROSCOPE (Completed)    Urinary tract infection without hematuria, site unspecified        Relevant Orders    POCT URINE DIPSTICK WITHOUT MICROSCOPE (Completed)          Orders Placed This Encounter    POCT URINE DIPSTICK WITHOUT MICROSCOPE    sulfamethoxazole-trimethoprim 800-160mg (BACTRIM DS) 800-160 mg Tab       Left lower quadrant abdominal pain consider UTI, renal stone, diverticulitis, however clinical evidence and point of service testing only supports UTI.  Will treat her briefly with some Bactrim as above but if no improvement in 2-3 days reconsider.  I have also asked that she call gyn in about the same time frame as they had asked her to follow-up in a week if symptoms persisted.  The pelvic ultrasound with gyn was unremarkable.    Follow-up if symptoms worsen or fail to improve.    Added culture  ________________________________________________________________  ________________________________________________________________      Chief Complaint   Patient presents with    Abdominal Pain     onset three weeks     History of present illness  This 33 y.o. presents today for complaint of left lower quadrant abdominal pain.  Symptoms started about 3 weeks ago.  She went saw her gynecologist who because of history of cyst had checked an ultrasound of the pelvis which was normal.  Patient continues to have the left lower quadrant abdominal pain.  He does radiate to the pelvic area and somewhat into the hip and upper leg at times.  She denies any back pain.  Patient denies rash present in the affected area.  She denies any urine changes.  She is  and sexually active.  She had a pelvic exam per  gyn.  Patient denies any nausea vomiting diarrhea or stool changes.  No known history of diverticulitis and no family history.  Symptoms were more severe previously but have improved somewhat.  At 1 point in the past she states she had a 10 on a scale of 1-10 when rating the pain.  Currently at about 1-2.  Review of systems  No fever or chills.  Weight is stable.  No nausea vomiting diarrhea constipation.  Coffee frequently makes her have a bowel movement which has been unchanged.  Patient denies rash  Patient does note scattered bruising on the extremities without known injury.  Occasional back pains but notes lifting case of water.   Answers for HPI/ROS submitted by the patient on 12/1/2018   Abdominal pain  Chronicity: recurrent  Onset: 1 to 4 weeks ago  Onset quality: sudden  Frequency: daily  Progression since onset: waxing and waning  Pain location: LLQ, left flank  Pain - numeric: 2/10  Pain quality: burning, dull, sharp  anorexia: No  arthralgias: No  belching: No  constipation: No  diarrhea: No  dysuria: No  fever: No  flatus: No  frequency: No  headaches: No  hematochezia: No  hematuria: No  melena: No  myalgias: No  nausea: No  weight loss: No  vomiting: No  Aggravated by: nothing  Relieved by: nothing  Diagnostic workup: ultrasound  Pain severity: mild  Treatments tried: acetaminophen, antacids  Improvement on treatment: mild  abdominal surgery: Yes  colon cancer: No  Crohn's disease: No  gallstones: Yes  GERD: No  irritable bowel syndrome: No  kidney stones: No  pancreatitis: No  PUD: No  ulcerative colitis: No  UTI: Yes  Past medical family social history reviewed.  Patient is new to me.  Follows with in the system.    Past Medical History:   Diagnosis Date    Ovarian cyst        Past Surgical History:   Procedure Laterality Date    CHOLECYSTECTOMY      cholescystectomy      ovarian tumor removal         Family History   Problem Relation Age of Onset    Cancer Father         lung ca     Breast  cancer Neg Hx     Ovarian cancer Neg Hx        Social History     Socioeconomic History    Marital status:      Spouse name: None    Number of children: None    Years of education: None    Highest education level: None   Social Needs    Financial resource strain: None    Food insecurity - worry: None    Food insecurity - inability: None    Transportation needs - medical: None    Transportation needs - non-medical: None   Occupational History    None   Tobacco Use    Smoking status: Never Smoker    Smokeless tobacco: Never Used   Substance and Sexual Activity    Alcohol use: No     Comment: rarely    Drug use: No    Sexual activity: Yes     Partners: Male     Birth control/protection: Partner-Vasectomy   Other Topics Concern    None   Social History Narrative    None       Current Outpatient Medications   Medication Sig Dispense Refill    sulfamethoxazole-trimethoprim 800-160mg (BACTRIM DS) 800-160 mg Tab Take 1 tablet by mouth 2 (two) times daily. for 7 days 14 tablet 0     No current facility-administered medications for this visit.        Review of patient's allergies indicates:   Allergen Reactions    Latex Rash       Physical examination  Vitals Reviewed  Gen. Well-dressed well-nourished   Skin warm dry and intact.  No rashes noted.  HEENT.  TM intact bilateral with normal light reflex.  No mastoid tenderness during percussion.  Nares patent bilateral.  Pharynx is unremarkable.  No maxillary or frontal sinus tenderness when percussed.    Neck is supple without adenopathy  Chest.  Respirations are even unlabored.  Lungs are clear to auscultation.  Cardiac regular rate and rhythm.  No chest wall adenopathy noted.  Neuro. Awake alert oriented x4.  Normal judgment and cognition noted.  Extremities no clubbing cyanosis or edema noted.     Call or return to clinic prn if these symptoms worsen or fail to improve as anticipated.

## 2018-12-05 LAB — BACTERIA UR CULT: NO GROWTH

## 2018-12-06 ENCOUNTER — PATIENT MESSAGE (OUTPATIENT)
Dept: FAMILY MEDICINE | Facility: CLINIC | Age: 33
End: 2018-12-06

## 2018-12-12 ENCOUNTER — PATIENT MESSAGE (OUTPATIENT)
Dept: FAMILY MEDICINE | Facility: CLINIC | Age: 33
End: 2018-12-12

## 2018-12-12 DIAGNOSIS — R10.32 LLQ ABDOMINAL PAIN: Primary | ICD-10-CM

## 2018-12-17 ENCOUNTER — LAB VISIT (OUTPATIENT)
Dept: LAB | Facility: HOSPITAL | Age: 33
End: 2018-12-17
Attending: NURSE PRACTITIONER
Payer: MEDICAID

## 2018-12-17 DIAGNOSIS — R10.32 LLQ ABDOMINAL PAIN: ICD-10-CM

## 2018-12-17 LAB
BILIRUB UR QL STRIP: NEGATIVE
CLARITY UR: CLEAR
COLOR UR: YELLOW
GLUCOSE UR QL STRIP: NEGATIVE
HGB UR QL STRIP: NEGATIVE
KETONES UR QL STRIP: NEGATIVE
LEUKOCYTE ESTERASE UR QL STRIP: NEGATIVE
NITRITE UR QL STRIP: NEGATIVE
PH UR STRIP: 8 [PH] (ref 5–8)
PROT UR QL STRIP: NEGATIVE
SP GR UR STRIP: 1.01 (ref 1–1.03)
URN SPEC COLLECT METH UR: NORMAL

## 2018-12-17 PROCEDURE — 81003 URINALYSIS AUTO W/O SCOPE: CPT | Mod: PO

## 2018-12-18 ENCOUNTER — PATIENT MESSAGE (OUTPATIENT)
Dept: FAMILY MEDICINE | Facility: CLINIC | Age: 33
End: 2018-12-18

## 2018-12-19 ENCOUNTER — PATIENT MESSAGE (OUTPATIENT)
Dept: FAMILY MEDICINE | Facility: CLINIC | Age: 33
End: 2018-12-19

## 2019-06-27 ENCOUNTER — PATIENT MESSAGE (OUTPATIENT)
Dept: FAMILY MEDICINE | Facility: CLINIC | Age: 34
End: 2019-06-27

## 2019-06-27 DIAGNOSIS — R09.A2 GLOBUS SENSATION: Primary | ICD-10-CM

## 2019-06-27 NOTE — TELEPHONE ENCOUNTER
Patient was scheduled for Monday, patient states no pain, just an uncomfortable feeling that something is stuck in throat. Please advise if any suggestions in meantime. thanks

## 2019-06-28 ENCOUNTER — PATIENT MESSAGE (OUTPATIENT)
Dept: FAMILY MEDICINE | Facility: CLINIC | Age: 34
End: 2019-06-28

## 2019-06-28 NOTE — TELEPHONE ENCOUNTER
If there will be a delay to that appt, then yes. If they can see her in the next few weeks, I can see her for f/u.

## 2019-10-18 ENCOUNTER — PATIENT MESSAGE (OUTPATIENT)
Dept: FAMILY MEDICINE | Facility: CLINIC | Age: 34
End: 2019-10-18

## 2019-10-18 ENCOUNTER — OFFICE VISIT (OUTPATIENT)
Dept: FAMILY MEDICINE | Facility: CLINIC | Age: 34
End: 2019-10-18
Payer: MEDICAID

## 2019-10-18 VITALS
SYSTOLIC BLOOD PRESSURE: 118 MMHG | DIASTOLIC BLOOD PRESSURE: 70 MMHG | BODY MASS INDEX: 23.12 KG/M2 | HEIGHT: 61 IN | TEMPERATURE: 98 F | HEART RATE: 80 BPM | RESPIRATION RATE: 18 BRPM | WEIGHT: 122.44 LBS | OXYGEN SATURATION: 97 %
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
## 2019-10-18 PROBLEM — Z30.8 ENCOUNTER FOR OTHER CONTRACEPTIVE MANAGEMENT: Status: RESOLVED | Noted: 2018-09-10 | Resolved: 2019-10-18

## 2019-10-18 PROCEDURE — 99213 OFFICE O/P EST LOW 20 MIN: CPT | Mod: PBBFAC,PN | Performed by: FAMILY MEDICINE

## 2019-10-18 PROCEDURE — 99999 PR PBB SHADOW E&M-EST. PATIENT-LVL III: ICD-10-PCS | Mod: PBBFAC,,, | Performed by: FAMILY MEDICINE

## 2019-10-18 PROCEDURE — 99213 OFFICE O/P EST LOW 20 MIN: CPT | Mod: S$PBB,,, | Performed by: FAMILY MEDICINE

## 2019-10-18 PROCEDURE — 99213 PR OFFICE/OUTPT VISIT, EST, LEVL III, 20-29 MIN: ICD-10-PCS | Mod: S$PBB,,, | Performed by: FAMILY MEDICINE

## 2019-10-18 PROCEDURE — 99999 PR PBB SHADOW E&M-EST. PATIENT-LVL III: CPT | Mod: PBBFAC,,, | Performed by: FAMILY MEDICINE

## 2019-10-18 RX ORDER — MUPIROCIN 20 MG/G
OINTMENT TOPICAL 3 TIMES DAILY
Qty: 30 G | Refills: 0 | Status: SHIPPED | OUTPATIENT
Start: 2019-10-18 | End: 2020-01-13 | Stop reason: ALTCHOICE

## 2019-10-18 RX ORDER — SULFAMETHOXAZOLE AND TRIMETHOPRIM 800; 160 MG/1; MG/1
1 TABLET ORAL 2 TIMES DAILY
Qty: 14 TABLET | Refills: 0 | Status: SHIPPED | OUTPATIENT
Start: 2019-10-18 | End: 2020-01-13 | Stop reason: ALTCHOICE

## 2019-10-18 NOTE — PROGRESS NOTES
"Subjective:       Patient ID: Stefanie Escalante is a 34 y.o. female.    Chief Complaint: Insect Bite    HPI  Patient with c/o insect bite to the L ankle during field trip yesterday. Type of bite unknown, but since, pt has noticed increased redness and swelling, itching and discomfort to the affected area. Afebrile. Ankle rom preserved.   No hx of anaphylaxis to insect bites.    Review of Systems:  Review of Systems   Constitutional: Negative for fatigue, fever and unexpected weight change.   HENT: Negative for facial swelling and trouble swallowing.    Musculoskeletal: Negative for arthralgias and gait problem.   Skin: Positive for color change. Negative for pallor, rash and wound.       Objective:     Vitals:    10/18/19 0936   BP: 118/70   BP Location: Left arm   Patient Position: Sitting   BP Method: Large (Manual)   Pulse: 80   Resp: 18   Temp: 98.4 °F (36.9 °C)   TempSrc: Oral   SpO2: 97%   Weight: 55.6 kg (122 lb 7.5 oz)   Height: 5' 1" (1.549 m)          Physical Exam   Constitutional: She is oriented to person, place, and time. She appears well-developed and well-nourished. No distress.   HENT:   Head: Normocephalic and atraumatic.   Eyes: Conjunctivae are normal. Right eye exhibits no discharge. Left eye exhibits no discharge. No scleral icterus.   Neck: Normal range of motion. Neck supple.   Cardiovascular: Normal rate.   Pulmonary/Chest: Effort normal. No respiratory distress.   Abdominal: Soft. She exhibits no distension.   Musculoskeletal: Normal range of motion. She exhibits no edema.   Neurological: She is alert and oriented to person, place, and time.   Skin: Skin is warm and dry. No rash noted.        Psychiatric: She has a normal mood and affect. Her behavior is normal.   Nursing note and vitals reviewed.        Assessment & Plan:  Bug bite with infection, initial encounter  -     sulfamethoxazole-trimethoprim 800-160mg (BACTRIM DS) 800-160 mg Tab; Take 1 tablet by mouth 2 (two) times daily.  " Dispense: 14 tablet; Refill: 0  -     mupirocin (BACTROBAN) 2 % ointment; Apply topically 3 (three) times daily.  Dispense: 30 g; Refill: 0    Lines were drawn around the erythema of the lesion.   Start bactrim today, topical bactroban.   Rest, ice, elevation reviewed.  Alarm sx reviewed for emergent f/u.

## 2019-10-21 ENCOUNTER — OFFICE VISIT (OUTPATIENT)
Dept: OBSTETRICS AND GYNECOLOGY | Facility: CLINIC | Age: 34
End: 2019-10-21
Payer: MEDICAID

## 2019-10-21 VITALS — HEIGHT: 61 IN | BODY MASS INDEX: 23.14 KG/M2

## 2019-10-21 DIAGNOSIS — N83.201 OVARIAN CYST, RIGHT: ICD-10-CM

## 2019-10-21 DIAGNOSIS — Z12.4 PAP SMEAR FOR CERVICAL CANCER SCREENING: Primary | ICD-10-CM

## 2019-10-21 PROCEDURE — 99395 PR PREVENTIVE VISIT,EST,18-39: ICD-10-PCS | Mod: S$PBB,,, | Performed by: OBSTETRICS & GYNECOLOGY

## 2019-10-21 PROCEDURE — 99395 PREV VISIT EST AGE 18-39: CPT | Mod: S$PBB,,, | Performed by: OBSTETRICS & GYNECOLOGY

## 2019-10-21 PROCEDURE — 99999 PR PBB SHADOW E&M-EST. PATIENT-LVL III: ICD-10-PCS | Mod: PBBFAC,,, | Performed by: OBSTETRICS & GYNECOLOGY

## 2019-10-21 PROCEDURE — 99213 OFFICE O/P EST LOW 20 MIN: CPT | Mod: PBBFAC,PN | Performed by: OBSTETRICS & GYNECOLOGY

## 2019-10-21 PROCEDURE — 99999 PR PBB SHADOW E&M-EST. PATIENT-LVL III: CPT | Mod: PBBFAC,,, | Performed by: OBSTETRICS & GYNECOLOGY

## 2019-10-21 PROCEDURE — 88175 CYTOPATH C/V AUTO FLUID REDO: CPT

## 2019-10-21 NOTE — PROGRESS NOTES
Chief Complaint   Patient presents with    Annual Exam       History and Physical:  Patient's last menstrual period was 10/05/2019 (approximate).       Stefanie Escalante is a 34 y.o.  Female who presents today for her routine annual GYN exam. The patient has no Gynecology complaints today. No bowel or bladder complaints.       Allergies:   Review of patient's allergies indicates:   Allergen Reactions    Latex Rash       Past Medical History:   Diagnosis Date    Ovarian cyst        Past Surgical History:   Procedure Laterality Date    CHOLECYSTECTOMY      cholescystectomy      ovarian tumor removal         MEDS:   Current Outpatient Medications on File Prior to Visit   Medication Sig Dispense Refill    mupirocin (BACTROBAN) 2 % ointment Apply topically 3 (three) times daily. 30 g 0    sulfamethoxazole-trimethoprim 800-160mg (BACTRIM DS) 800-160 mg Tab Take 1 tablet by mouth 2 (two) times daily. 14 tablet 0     No current facility-administered medications on file prior to visit.        OB History        3    Para   2    Term                AB        Living           SAB        TAB        Ectopic        Multiple        Live Births                     Social History     Socioeconomic History    Marital status:      Spouse name: Not on file    Number of children: Not on file    Years of education: Not on file    Highest education level: Not on file   Occupational History    Not on file   Social Needs    Financial resource strain: Somewhat hard    Food insecurity:     Worry: Never true     Inability: Never true    Transportation needs:     Medical: No     Non-medical: No   Tobacco Use    Smoking status: Never Smoker    Smokeless tobacco: Never Used   Substance and Sexual Activity    Alcohol use: No     Frequency: Monthly or less     Drinks per session: 1 or 2     Binge frequency: Never     Comment: rarely    Drug use: No    Sexual activity: Yes     Partners: Male     Birth  "control/protection: Partner-Vasectomy   Lifestyle    Physical activity:     Days per week: 6 days     Minutes per session: 30 min    Stress: To some extent   Relationships    Social connections:     Talks on phone: Once a week     Gets together: Never     Attends Buddhist service: Not on file     Active member of club or organization: No     Attends meetings of clubs or organizations: Never     Relationship status:    Other Topics Concern    Not on file   Social History Narrative    Not on file       Family History   Problem Relation Age of Onset    Cancer Father         lung ca     Breast cancer Neg Hx     Ovarian cancer Neg Hx          Past medical and surgical history reviewed.   I have reviewed the patient's medical history in detail and updated the computerized patient record.        Review of System:   General: no chills, fever, night sweats, weight gain or weight loss  Psychological: no depression or suicidal ideation  Breasts: no new or changing breast lumps, nipple discharge or masses.  Respiratory: no cough, shortness of breath, or wheezing  Cardiovascular: no chest pain or dyspnea on exertion  Gastrointestinal: no abdominal pain, change in bowel habits, or black or bloody stools  Genito-Urinary: no incontinence, urinary frequency/urgency or vulvar/vaginal symptoms, pelvic pain or abnormal vaginal bleeding.  Musculoskeletal: no gait disturbance or muscular weakness      Physical Exam:   Ht 5' 1" (1.549 m)   LMP 10/05/2019 (Approximate)   BMI 23.14 kg/m²   Constitutional: She appears alert and responsive. She appears well-developed, well-groomed, and well-nourished. No distress. Thin  HENT:   Head: Normocephalic and atraumatic.   Eyes: Conjunctivae and EOM are normal. No scleral icterus.   Neck: Symmetrical. Normal range of motion. Neck supple. No tracheal deviation present. THYROID: without masses or tenderness.  Cardiovascular: Normal rate, no rhythm abnormality noted. Extremities " without swelling or edema, warm.    Pulmonary/Chest: Normal respiratory Effort. No distress or retractions. She exhibits no tenderness.  Breasts: are symmetrical.   Right breast exhibits no inverted nipple, no mass, no nipple discharge, no skin change and no tenderness.   Left breast exhibits no inverted nipple, no mass, no nipple discharge, no skin change and no tenderness.  Abdominal: Soft. She exhibits no distension, hernias or masses. There is no tenderness. No enlargement of liver edge or spleen.  There is no rebound and no guarding.   Genitourinary:    External rectal exam shows no thrombosed external hemorrhoids, no lesions.     Pelvic exam was performed with patient supine.   No labial fusion, and symmetrical.    There is no rash, lesion or injury on the right labia.   There is no rash, lesion or injury on the left labia.   No bleeding and no signs of injury around the vaginal introitus, urethral meatus is normal size and without prolapse or lesions, urethra well supported. The cervix is visualized with no discharge, lesions or friability.   No vaginal discharge found.   No significant Cystocele, Enterocele or rectocele, and cervix and uterus well supported.   Bimanual exam:   The urethra is normal to palpation and there are no palpable vaginal wall masses.   Uterus is not deviated, not enlarged, not fixed, normal shape and not tender.   Cervix exhibits no motion tenderness.    Right adnexum displays fullness, no mass or nodularity and no tenderness.   Left adnexum displays no mass or nodularity and no tenderness.  Musculoskeletal: Normal range of motion.   Lymphadenopathy: No inguinal adenopathy present.   Neurological: She is alert and oriented to person, place, and time. Coordination normal.   Skin: Skin is warm and dry. She is not diaphoretic. No rashes, lesions or ulcers.   Psychiatric: She has a normal mood and affect, oriented to person, place, and time.      Assessment:   Normal annual GYN exam  1.  Pap smear for cervical cancer screening  Liquid-based pap smear, screening   2. Ovarian cyst, right  US Pelvis Complete Non OB       Plan:   PAP  U/s - right fullness, h/o dermoid  Follow up in 1 year.  Patient informed will be contacted with results within 2 weeks. Encouraged to please call back or email if she has not heard from us by then.

## 2019-10-28 ENCOUNTER — HOSPITAL ENCOUNTER (OUTPATIENT)
Dept: RADIOLOGY | Facility: HOSPITAL | Age: 34
Discharge: HOME | End: 2019-10-28
Attending: OBSTETRICS & GYNECOLOGY
Payer: MEDICAID

## 2019-10-28 DIAGNOSIS — N83.201 OVARIAN CYST, RIGHT: ICD-10-CM

## 2019-10-28 PROCEDURE — 76830 TRANSVAGINAL US NON-OB: CPT | Mod: 26,,, | Performed by: RADIOLOGY

## 2019-10-28 PROCEDURE — 76856 US EXAM PELVIC COMPLETE: CPT | Mod: 26,,, | Performed by: RADIOLOGY

## 2019-10-28 PROCEDURE — 76830 TRANSVAGINAL US NON-OB: CPT | Mod: TC,PN

## 2019-10-28 PROCEDURE — 76856 US PELVIS COMP WITH TRANSVAG NON-OB (XPD): ICD-10-PCS | Mod: 26,,, | Performed by: RADIOLOGY

## 2019-10-28 PROCEDURE — 76830 US PELVIS COMP WITH TRANSVAG NON-OB (XPD): ICD-10-PCS | Mod: 26,,, | Performed by: RADIOLOGY

## 2019-11-29 ENCOUNTER — TELEPHONE (OUTPATIENT)
Dept: FAMILY MEDICINE | Facility: CLINIC | Age: 34
End: 2019-11-29

## 2020-01-13 ENCOUNTER — OFFICE VISIT (OUTPATIENT)
Dept: FAMILY MEDICINE | Facility: CLINIC | Age: 35
End: 2020-01-13
Payer: MEDICAID

## 2020-01-13 ENCOUNTER — LAB VISIT (OUTPATIENT)
Dept: LAB | Facility: HOSPITAL | Age: 35
End: 2020-01-13
Attending: FAMILY MEDICINE
Payer: MEDICAID

## 2020-01-13 VITALS
HEIGHT: 61 IN | TEMPERATURE: 98 F | DIASTOLIC BLOOD PRESSURE: 60 MMHG | SYSTOLIC BLOOD PRESSURE: 102 MMHG | HEART RATE: 69 BPM | OXYGEN SATURATION: 98 % | WEIGHT: 123 LBS | BODY MASS INDEX: 23.22 KG/M2

## 2020-01-13 DIAGNOSIS — Z00.00 ROUTINE GENERAL MEDICAL EXAMINATION AT A HEALTH CARE FACILITY: ICD-10-CM

## 2020-01-13 DIAGNOSIS — Z00.00 ROUTINE GENERAL MEDICAL EXAMINATION AT A HEALTH CARE FACILITY: Primary | ICD-10-CM

## 2020-01-13 LAB
ALBUMIN SERPL BCP-MCNC: 4.2 G/DL (ref 3.5–5.2)
ALP SERPL-CCNC: 42 U/L (ref 55–135)
ALT SERPL W/O P-5'-P-CCNC: 19 U/L (ref 10–44)
ANION GAP SERPL CALC-SCNC: 8 MMOL/L (ref 8–16)
AST SERPL-CCNC: 24 U/L (ref 10–40)
BASOPHILS # BLD AUTO: 0.05 K/UL (ref 0–0.2)
BASOPHILS NFR BLD: 1 % (ref 0–1.9)
BILIRUB SERPL-MCNC: 0.8 MG/DL (ref 0.1–1)
BUN SERPL-MCNC: 10 MG/DL (ref 6–20)
CALCIUM SERPL-MCNC: 9.6 MG/DL (ref 8.7–10.5)
CHLORIDE SERPL-SCNC: 107 MMOL/L (ref 95–110)
CHOLEST SERPL-MCNC: 190 MG/DL (ref 120–199)
CHOLEST/HDLC SERPL: 2.5 {RATIO} (ref 2–5)
CO2 SERPL-SCNC: 26 MMOL/L (ref 23–29)
CREAT SERPL-MCNC: 0.7 MG/DL (ref 0.5–1.4)
DIFFERENTIAL METHOD: NORMAL
EOSINOPHIL # BLD AUTO: 0.1 K/UL (ref 0–0.5)
EOSINOPHIL NFR BLD: 1.2 % (ref 0–8)
ERYTHROCYTE [DISTWIDTH] IN BLOOD BY AUTOMATED COUNT: 12 % (ref 11.5–14.5)
EST. GFR  (AFRICAN AMERICAN): >60 ML/MIN/1.73 M^2
EST. GFR  (NON AFRICAN AMERICAN): >60 ML/MIN/1.73 M^2
ESTIMATED AVG GLUCOSE: 103 MG/DL (ref 68–131)
GLUCOSE SERPL-MCNC: 91 MG/DL (ref 70–110)
HBA1C MFR BLD HPLC: 5.2 % (ref 4–5.6)
HCT VFR BLD AUTO: 41.8 % (ref 37–48.5)
HDLC SERPL-MCNC: 77 MG/DL (ref 40–75)
HDLC SERPL: 40.5 % (ref 20–50)
HGB BLD-MCNC: 13.5 G/DL (ref 12–16)
IMM GRANULOCYTES # BLD AUTO: 0.01 K/UL (ref 0–0.04)
IMM GRANULOCYTES NFR BLD AUTO: 0.2 % (ref 0–0.5)
LDLC SERPL CALC-MCNC: 90.6 MG/DL (ref 63–159)
LYMPHOCYTES # BLD AUTO: 1.7 K/UL (ref 1–4.8)
LYMPHOCYTES NFR BLD: 33.7 % (ref 18–48)
MCH RBC QN AUTO: 31 PG (ref 27–31)
MCHC RBC AUTO-ENTMCNC: 32.3 G/DL (ref 32–36)
MCV RBC AUTO: 96 FL (ref 82–98)
MONOCYTES # BLD AUTO: 0.4 K/UL (ref 0.3–1)
MONOCYTES NFR BLD: 7.1 % (ref 4–15)
NEUTROPHILS # BLD AUTO: 2.8 K/UL (ref 1.8–7.7)
NEUTROPHILS NFR BLD: 56.8 % (ref 38–73)
NONHDLC SERPL-MCNC: 113 MG/DL
NRBC BLD-RTO: 0 /100 WBC
PLATELET # BLD AUTO: 227 K/UL (ref 150–350)
PMV BLD AUTO: 10.6 FL (ref 9.2–12.9)
POTASSIUM SERPL-SCNC: 4.3 MMOL/L (ref 3.5–5.1)
PROT SERPL-MCNC: 7.5 G/DL (ref 6–8.4)
RBC # BLD AUTO: 4.35 M/UL (ref 4–5.4)
SODIUM SERPL-SCNC: 141 MMOL/L (ref 136–145)
TRIGL SERPL-MCNC: 112 MG/DL (ref 30–150)
WBC # BLD AUTO: 4.95 K/UL (ref 3.9–12.7)

## 2020-01-13 PROCEDURE — 80061 LIPID PANEL: CPT

## 2020-01-13 PROCEDURE — 99395 PR PREVENTIVE VISIT,EST,18-39: ICD-10-PCS | Mod: S$PBB,,, | Performed by: FAMILY MEDICINE

## 2020-01-13 PROCEDURE — 85025 COMPLETE CBC W/AUTO DIFF WBC: CPT

## 2020-01-13 PROCEDURE — 36415 COLL VENOUS BLD VENIPUNCTURE: CPT | Mod: PN

## 2020-01-13 PROCEDURE — 99395 PREV VISIT EST AGE 18-39: CPT | Mod: S$PBB,,, | Performed by: FAMILY MEDICINE

## 2020-01-13 PROCEDURE — 99999 PR PBB SHADOW E&M-EST. PATIENT-LVL IV: ICD-10-PCS | Mod: PBBFAC,,, | Performed by: FAMILY MEDICINE

## 2020-01-13 PROCEDURE — 84443 ASSAY THYROID STIM HORMONE: CPT

## 2020-01-13 PROCEDURE — 82306 VITAMIN D 25 HYDROXY: CPT

## 2020-01-13 PROCEDURE — 99214 OFFICE O/P EST MOD 30 MIN: CPT | Mod: PBBFAC,PN | Performed by: FAMILY MEDICINE

## 2020-01-13 PROCEDURE — 83036 HEMOGLOBIN GLYCOSYLATED A1C: CPT

## 2020-01-13 PROCEDURE — 80053 COMPREHEN METABOLIC PANEL: CPT

## 2020-01-13 PROCEDURE — 99999 PR PBB SHADOW E&M-EST. PATIENT-LVL IV: CPT | Mod: PBBFAC,,, | Performed by: FAMILY MEDICINE

## 2020-01-13 NOTE — PROGRESS NOTES
Subjective:       Patient ID: Stefanie Escalante is a 34 y.o. female.    Chief Complaint: Annual Exam      Stefanie Escalante is in the office for annual exam.    HPI  No updates to medical hx.   Past Medical History:   Diagnosis Date    Ovarian cyst      Notes slight increase in hair thinning, frontal.    Current Outpatient Medications:     multivit with min-folic acid (CENTRUM MULTIGUMMIES) 80 mcg Chew, Take 1 tablet by mouth once daily., Disp: , Rfl:     The ASCVD Risk score (Auralizzie DALE Jr., et al., 2013) failed to calculate for the following reasons:    The 2013 ASCVD risk score is only valid for ages 40 to 79     No results found for: HGBA1C  Lab Results   Component Value Date    LDLCALC 75.6 01/23/2017    CREATININE 0.8 01/23/2017   labs 2017 rev.    Review of Systems   Constitutional: Negative for activity change, fatigue and unexpected weight change.   HENT: Negative for congestion, hearing loss, postnasal drip, rhinorrhea and trouble swallowing.    Eyes: Negative for discharge and visual disturbance.   Respiratory: Negative for cough, chest tightness, shortness of breath and wheezing.    Cardiovascular: Negative for chest pain and palpitations.   Gastrointestinal: Negative for blood in stool, constipation, diarrhea and vomiting.        No gerd c/o.   Endocrine: Negative for polydipsia and polyuria.   Genitourinary: Negative for difficulty urinating, dysuria, hematuria and menstrual problem (regular).   Musculoskeletal: Negative for arthralgias, joint swelling and neck pain.        Exercise: pilates/yoga   Skin: Negative for color change and rash.   Neurological: Negative for dizziness, weakness, light-headedness and headaches.   Psychiatric/Behavioral: Positive for sleep disturbance. Negative for confusion and dysphoric mood.           Objective:      Physical Exam   Constitutional: She is oriented to person, place, and time. She appears well-developed and well-nourished. No distress.   HENT:   Head: Normocephalic  and atraumatic.   Right Ear: External ear normal.   Left Ear: External ear normal.   Nose: Nose normal.   Mouth/Throat: Oropharynx is clear and moist. No oropharyngeal exudate.   Eyes: Pupils are equal, round, and reactive to light. Conjunctivae and EOM are normal. Right eye exhibits no discharge. Left eye exhibits no discharge. No scleral icterus.   Neck: Normal range of motion. Neck supple. No thyromegaly present.   Cardiovascular: Normal rate and regular rhythm.   Pulmonary/Chest: Effort normal and breath sounds normal. No respiratory distress. She has no wheezes.   Abdominal: Soft. Bowel sounds are normal. She exhibits no distension and no mass. There is no tenderness. There is no rebound and no guarding.   Musculoskeletal: Normal range of motion. She exhibits no edema.   Lymphadenopathy:     She has no cervical adenopathy.   Neurological: She is alert and oriented to person, place, and time.   Skin: Skin is warm and dry. No rash noted.   Psychiatric: She has a normal mood and affect. Her behavior is normal.   Nursing note and vitals reviewed.          Screening recommendations appropriate to age and health status were reviewed.    Assessment & Plan:    Routine general medical examination at a health care facility  -     CBC auto differential; Future; Expected date: 01/13/2020  -     Comprehensive metabolic panel; Future; Expected date: 01/13/2020  -     Hemoglobin A1c; Future; Expected date: 01/13/2020  -     Lipid panel; Future; Expected date: 01/13/2020  -     TSH; Future; Expected date: 01/13/2020  -     Vitamin D; Future; Expected date: 01/13/2020    Anticipatory guidance reviewed.

## 2020-01-14 LAB
25(OH)D3+25(OH)D2 SERPL-MCNC: 26 NG/ML (ref 30–96)
TSH SERPL DL<=0.005 MIU/L-ACNC: 1.03 UIU/ML (ref 0.4–4)

## 2021-04-06 ENCOUNTER — PATIENT MESSAGE (OUTPATIENT)
Dept: ADMINISTRATIVE | Facility: HOSPITAL | Age: 36
End: 2021-04-06

## 2021-05-06 ENCOUNTER — PATIENT MESSAGE (OUTPATIENT)
Dept: RESEARCH | Facility: HOSPITAL | Age: 36
End: 2021-05-06

## 2021-05-22 ENCOUNTER — PATIENT MESSAGE (OUTPATIENT)
Dept: FAMILY MEDICINE | Facility: CLINIC | Age: 36
End: 2021-05-22

## 2021-05-22 DIAGNOSIS — R21 RASH: Primary | ICD-10-CM

## 2021-05-24 RX ORDER — TRIAMCINOLONE ACETONIDE 5 MG/G
CREAM TOPICAL 2 TIMES DAILY
Qty: 15 G | Refills: 1 | Status: SHIPPED | OUTPATIENT
Start: 2021-05-24 | End: 2022-11-30

## 2021-07-07 ENCOUNTER — PATIENT MESSAGE (OUTPATIENT)
Dept: ADMINISTRATIVE | Facility: HOSPITAL | Age: 36
End: 2021-07-07

## 2021-08-03 ENCOUNTER — TELEPHONE (OUTPATIENT)
Dept: DERMATOLOGY | Facility: CLINIC | Age: 36
End: 2021-08-03

## 2021-09-20 ENCOUNTER — OFFICE VISIT (OUTPATIENT)
Dept: FAMILY MEDICINE | Facility: CLINIC | Age: 36
End: 2021-09-20
Payer: MEDICAID

## 2021-09-20 VITALS
WEIGHT: 116.19 LBS | SYSTOLIC BLOOD PRESSURE: 120 MMHG | HEART RATE: 78 BPM | BODY MASS INDEX: 21.94 KG/M2 | HEIGHT: 61 IN | DIASTOLIC BLOOD PRESSURE: 74 MMHG

## 2021-09-20 DIAGNOSIS — Z00.00 ROUTINE GENERAL MEDICAL EXAMINATION AT A HEALTH CARE FACILITY: Primary | ICD-10-CM

## 2021-09-20 PROCEDURE — 99214 OFFICE O/P EST MOD 30 MIN: CPT | Mod: PBBFAC,PN | Performed by: FAMILY MEDICINE

## 2021-09-20 PROCEDURE — 99999 PR PBB SHADOW E&M-EST. PATIENT-LVL IV: ICD-10-PCS | Mod: PBBFAC,,, | Performed by: FAMILY MEDICINE

## 2021-09-20 PROCEDURE — 99395 PREV VISIT EST AGE 18-39: CPT | Mod: S$PBB,,, | Performed by: FAMILY MEDICINE

## 2021-09-20 PROCEDURE — 99999 PR PBB SHADOW E&M-EST. PATIENT-LVL IV: CPT | Mod: PBBFAC,,, | Performed by: FAMILY MEDICINE

## 2021-09-20 PROCEDURE — 99395 PR PREVENTIVE VISIT,EST,18-39: ICD-10-PCS | Mod: S$PBB,,, | Performed by: FAMILY MEDICINE

## 2021-10-11 ENCOUNTER — LAB VISIT (OUTPATIENT)
Dept: LAB | Facility: HOSPITAL | Age: 36
End: 2021-10-11
Attending: FAMILY MEDICINE
Payer: MEDICAID

## 2021-10-11 DIAGNOSIS — Z00.00 ROUTINE GENERAL MEDICAL EXAMINATION AT A HEALTH CARE FACILITY: ICD-10-CM

## 2021-10-11 LAB
25(OH)D3+25(OH)D2 SERPL-MCNC: 50 NG/ML (ref 30–96)
ALBUMIN SERPL BCP-MCNC: 4.2 G/DL (ref 3.5–5.2)
ALP SERPL-CCNC: 50 U/L (ref 55–135)
ALT SERPL W/O P-5'-P-CCNC: 20 U/L (ref 10–44)
ANION GAP SERPL CALC-SCNC: 12 MMOL/L (ref 8–16)
AST SERPL-CCNC: 18 U/L (ref 10–40)
BILIRUB SERPL-MCNC: 0.9 MG/DL (ref 0.1–1)
BUN SERPL-MCNC: 11 MG/DL (ref 6–20)
CALCIUM SERPL-MCNC: 9.6 MG/DL (ref 8.7–10.5)
CHLORIDE SERPL-SCNC: 106 MMOL/L (ref 95–110)
CHOLEST SERPL-MCNC: 178 MG/DL (ref 120–199)
CHOLEST/HDLC SERPL: 2.3 {RATIO} (ref 2–5)
CO2 SERPL-SCNC: 21 MMOL/L (ref 23–29)
CREAT SERPL-MCNC: 0.7 MG/DL (ref 0.5–1.4)
ERYTHROCYTE [DISTWIDTH] IN BLOOD BY AUTOMATED COUNT: 12.6 % (ref 11.5–14.5)
EST. GFR  (AFRICAN AMERICAN): >60 ML/MIN/1.73 M^2
EST. GFR  (NON AFRICAN AMERICAN): >60 ML/MIN/1.73 M^2
GLUCOSE SERPL-MCNC: 91 MG/DL (ref 70–110)
HCT VFR BLD AUTO: 40.7 % (ref 37–48.5)
HDLC SERPL-MCNC: 77 MG/DL (ref 40–75)
HDLC SERPL: 43.3 % (ref 20–50)
HGB BLD-MCNC: 13.9 G/DL (ref 12–16)
IRON SERPL-MCNC: 120 UG/DL (ref 30–160)
LDLC SERPL CALC-MCNC: 83.6 MG/DL (ref 63–159)
MCH RBC QN AUTO: 31.4 PG (ref 27–31)
MCHC RBC AUTO-ENTMCNC: 34.2 G/DL (ref 32–36)
MCV RBC AUTO: 92 FL (ref 82–98)
NONHDLC SERPL-MCNC: 101 MG/DL
PLATELET # BLD AUTO: 249 K/UL (ref 150–450)
PMV BLD AUTO: 10 FL (ref 9.2–12.9)
POTASSIUM SERPL-SCNC: 3.8 MMOL/L (ref 3.5–5.1)
PROT SERPL-MCNC: 7.7 G/DL (ref 6–8.4)
RBC # BLD AUTO: 4.42 M/UL (ref 4–5.4)
SODIUM SERPL-SCNC: 139 MMOL/L (ref 136–145)
TRIGL SERPL-MCNC: 87 MG/DL (ref 30–150)
TSH SERPL DL<=0.005 MIU/L-ACNC: 1.76 UIU/ML (ref 0.4–4)
WBC # BLD AUTO: 6.19 K/UL (ref 3.9–12.7)

## 2021-10-11 PROCEDURE — 85027 COMPLETE CBC AUTOMATED: CPT | Performed by: FAMILY MEDICINE

## 2021-10-11 PROCEDURE — 83540 ASSAY OF IRON: CPT | Performed by: FAMILY MEDICINE

## 2021-10-11 PROCEDURE — 82306 VITAMIN D 25 HYDROXY: CPT | Performed by: FAMILY MEDICINE

## 2021-10-11 PROCEDURE — 84443 ASSAY THYROID STIM HORMONE: CPT | Performed by: FAMILY MEDICINE

## 2021-10-11 PROCEDURE — 36415 COLL VENOUS BLD VENIPUNCTURE: CPT | Mod: PO | Performed by: FAMILY MEDICINE

## 2021-10-11 PROCEDURE — 80053 COMPREHEN METABOLIC PANEL: CPT | Performed by: FAMILY MEDICINE

## 2021-10-11 PROCEDURE — 86803 HEPATITIS C AB TEST: CPT | Performed by: FAMILY MEDICINE

## 2021-10-11 PROCEDURE — 80061 LIPID PANEL: CPT | Performed by: FAMILY MEDICINE

## 2021-10-12 LAB — HCV AB SERPL QL IA: NEGATIVE

## 2021-10-16 ENCOUNTER — PATIENT OUTREACH (OUTPATIENT)
Dept: ADMINISTRATIVE | Facility: OTHER | Age: 36
End: 2021-10-16

## 2021-10-18 ENCOUNTER — OFFICE VISIT (OUTPATIENT)
Dept: OBSTETRICS AND GYNECOLOGY | Facility: CLINIC | Age: 36
End: 2021-10-18
Payer: MEDICAID

## 2021-10-18 VITALS
HEIGHT: 61 IN | BODY MASS INDEX: 22.64 KG/M2 | WEIGHT: 119.94 LBS | DIASTOLIC BLOOD PRESSURE: 68 MMHG | SYSTOLIC BLOOD PRESSURE: 104 MMHG

## 2021-10-18 DIAGNOSIS — Z01.419 WELL WOMAN EXAM: Primary | ICD-10-CM

## 2021-10-18 PROCEDURE — 99395 PREV VISIT EST AGE 18-39: CPT | Mod: S$PBB,,, | Performed by: OBSTETRICS & GYNECOLOGY

## 2021-10-18 PROCEDURE — 88175 CYTOPATH C/V AUTO FLUID REDO: CPT | Performed by: OBSTETRICS & GYNECOLOGY

## 2021-10-18 PROCEDURE — 99395 PR PREVENTIVE VISIT,EST,18-39: ICD-10-PCS | Mod: S$PBB,,, | Performed by: OBSTETRICS & GYNECOLOGY

## 2021-10-18 PROCEDURE — 99999 PR PBB SHADOW E&M-EST. PATIENT-LVL III: ICD-10-PCS | Mod: PBBFAC,,, | Performed by: OBSTETRICS & GYNECOLOGY

## 2021-10-18 PROCEDURE — 99213 OFFICE O/P EST LOW 20 MIN: CPT | Mod: PBBFAC,PN | Performed by: OBSTETRICS & GYNECOLOGY

## 2021-10-18 PROCEDURE — 99999 PR PBB SHADOW E&M-EST. PATIENT-LVL III: CPT | Mod: PBBFAC,,, | Performed by: OBSTETRICS & GYNECOLOGY

## 2021-10-21 LAB
FINAL PATHOLOGIC DIAGNOSIS: NORMAL
Lab: NORMAL

## 2021-12-01 ENCOUNTER — PATIENT MESSAGE (OUTPATIENT)
Dept: FAMILY MEDICINE | Facility: CLINIC | Age: 36
End: 2021-12-01
Payer: MEDICAID

## 2021-12-14 ENCOUNTER — PATIENT MESSAGE (OUTPATIENT)
Dept: FAMILY MEDICINE | Facility: CLINIC | Age: 36
End: 2021-12-14
Payer: MEDICAID

## 2022-10-11 ENCOUNTER — PATIENT MESSAGE (OUTPATIENT)
Dept: FAMILY MEDICINE | Facility: CLINIC | Age: 37
End: 2022-10-11
Payer: MEDICAID

## 2022-10-24 ENCOUNTER — PATIENT MESSAGE (OUTPATIENT)
Dept: FAMILY MEDICINE | Facility: CLINIC | Age: 37
End: 2022-10-24
Payer: MEDICAID

## 2022-10-26 ENCOUNTER — PATIENT MESSAGE (OUTPATIENT)
Dept: FAMILY MEDICINE | Facility: CLINIC | Age: 37
End: 2022-10-26
Payer: MEDICAID

## 2022-11-26 ENCOUNTER — PATIENT MESSAGE (OUTPATIENT)
Dept: FAMILY MEDICINE | Facility: CLINIC | Age: 37
End: 2022-11-26
Payer: MEDICAID

## 2022-11-28 ENCOUNTER — PATIENT MESSAGE (OUTPATIENT)
Dept: FAMILY MEDICINE | Facility: CLINIC | Age: 37
End: 2022-11-28
Payer: MEDICAID

## 2022-11-30 ENCOUNTER — OFFICE VISIT (OUTPATIENT)
Dept: FAMILY MEDICINE | Facility: CLINIC | Age: 37
End: 2022-11-30
Payer: MEDICAID

## 2022-11-30 DIAGNOSIS — J02.9 SORE THROAT: Primary | ICD-10-CM

## 2022-11-30 PROCEDURE — 1159F MED LIST DOCD IN RCRD: CPT | Mod: CPTII,95,, | Performed by: NURSE PRACTITIONER

## 2022-11-30 PROCEDURE — 1160F RVW MEDS BY RX/DR IN RCRD: CPT | Mod: CPTII,95,, | Performed by: NURSE PRACTITIONER

## 2022-11-30 PROCEDURE — 99213 PR OFFICE/OUTPT VISIT, EST, LEVL III, 20-29 MIN: ICD-10-PCS | Mod: 95,,, | Performed by: NURSE PRACTITIONER

## 2022-11-30 PROCEDURE — 1160F PR REVIEW ALL MEDS BY PRESCRIBER/CLIN PHARMACIST DOCUMENTED: ICD-10-PCS | Mod: CPTII,95,, | Performed by: NURSE PRACTITIONER

## 2022-11-30 PROCEDURE — 1159F PR MEDICATION LIST DOCUMENTED IN MEDICAL RECORD: ICD-10-PCS | Mod: CPTII,95,, | Performed by: NURSE PRACTITIONER

## 2022-11-30 PROCEDURE — 99213 OFFICE O/P EST LOW 20 MIN: CPT | Mod: 95,,, | Performed by: NURSE PRACTITIONER

## 2022-11-30 RX ORDER — METHYLPREDNISOLONE 4 MG/1
TABLET ORAL
Qty: 21 TABLET | Refills: 0 | Status: SHIPPED | OUTPATIENT
Start: 2022-12-01 | End: 2023-11-09

## 2022-11-30 NOTE — PROGRESS NOTES
Subjective:       Patient ID: Stefanie Escalante is a 37 y.o. female.    The patient location is: [place]  The chief complaint leading to consultation is: sore throat    Visit type: audiovisual    Face to Face time with patient: 10min  15 minutes of total time spent on the encounter, which includes face to face time and non-face to face time preparing to see the patient (eg, review of tests), Obtaining and/or reviewing separately obtained history, Documenting clinical information in the electronic or other health record, Independently interpreting results (not separately reported) and communicating results to the patient/family/caregiver, or Care coordination (not separately reported).     Each patient to whom he or she provides medical services by telemedicine is:  (1) informed of the relationship between the physician and patient and the respective role of any other health care provider with respect to management of the patient; and (2) notified that he or she may decline to receive medical services by telemedicine and may withdraw from such care at any time.    Sore Throat   This is a new problem. The current episode started 1 to 4 weeks ago. The problem has been gradually improving. The pain is worse on the right side. There has been no fever. The pain is at a severity of 1/10. The pain is mild. Pertinent negatives include no abdominal pain, congestion, diarrhea, drooling, ear discharge, ear pain, headaches, hoarse voice, plugged ear sensation, neck pain, shortness of breath, stridor, swollen glands, trouble swallowing or vomiting. She has had no exposure to strep or mono. She has tried NSAIDs, acetaminophen and gargles for the symptoms. The treatment provided significant relief.   New patient to me reports PND, sore throat, increased mucus onset 3 weeks ago. A few days of green/discolored mucus resolved back to clear, now scant amount PND/mucus.   She has had persistent right sided throat pain. Denies erythema or  pus/discoloration. Denies lymphadenopathy or fever. No ill contacts--daughter with similar symptoms at onset==daughter tested negative for flu, strep and covid at urgent care   She reports uvula swollen at onset of symptoms--now normal   Area hurts when she swallows but otherwise no issues.   Denies observable tonsil stone  Denies sinus pain/pressure, headache or ear pain   She has tried tylenol, ibuprofen, chloraseptic throat spray, antihistamines--not resolvign     LMP: currently on cycle     Review of Systems   HENT:  Positive for postnasal drip and sore throat. Negative for nasal congestion, drooling, ear discharge, ear pain, hoarse voice, sinus pressure/congestion and trouble swallowing.    Respiratory:  Negative for shortness of breath and stridor.    Gastrointestinal:  Negative for abdominal pain, diarrhea and vomiting.   Musculoskeletal:  Negative for neck pain.   Neurological:  Negative for headaches.       Objective:      Physical Exam  Constitutional:       General: She is not in acute distress.     Appearance: She is well-developed. She is not ill-appearing, toxic-appearing or diaphoretic.   HENT:      Right Ear: Hearing normal.      Left Ear: Hearing normal.   Pulmonary:      Effort: No tachypnea or respiratory distress.   Skin:     Coloration: Skin is not pale.   Neurological:      Mental Status: She is alert and oriented to person, place, and time.   Psychiatric:         Speech: Speech normal.         Behavior: Behavior normal.         Thought Content: Thought content normal.         Judgment: Judgment normal.       Assessment:       1. Sore throat        Plan:       Sore throat    Other orders  -     methylPREDNISolone (MEDROL DOSEPACK) 4 mg tablet; use as directed  Dispense: 21 tablet; Refill: 0        Persistent localized sore throat following viral illness--not resolving with conservative management  Start medrol dosepack + antihistamine. FU in clinic if symptoms do not resolve      Medication  List with Changes/Refills   New Medications    METHYLPREDNISOLONE (MEDROL DOSEPACK) 4 MG TABLET    use as directed   Current Medications    MULTIVIT WITH MIN-FOLIC ACID (CENTRUM MULTIGUMMIES) 80 MCG CHEW    Take 1 tablet by mouth once daily.    TRIAMCINOLONE ACETONIDE 0.5% (KENALOG) 0.5 % CREA    Apply topically 2 (two) times daily.

## 2022-12-19 ENCOUNTER — OFFICE VISIT (OUTPATIENT)
Dept: OBSTETRICS AND GYNECOLOGY | Facility: CLINIC | Age: 37
End: 2022-12-19
Payer: MEDICAID

## 2022-12-19 VITALS
BODY MASS INDEX: 23.85 KG/M2 | SYSTOLIC BLOOD PRESSURE: 114 MMHG | WEIGHT: 129.63 LBS | DIASTOLIC BLOOD PRESSURE: 70 MMHG | HEIGHT: 62 IN

## 2022-12-19 DIAGNOSIS — Z01.419 GYNECOLOGIC EXAM NORMAL: Primary | ICD-10-CM

## 2022-12-19 PROCEDURE — 1159F MED LIST DOCD IN RCRD: CPT | Mod: CPTII,,, | Performed by: OBSTETRICS & GYNECOLOGY

## 2022-12-19 PROCEDURE — 99395 PR PREVENTIVE VISIT,EST,18-39: ICD-10-PCS | Mod: S$PBB,,, | Performed by: OBSTETRICS & GYNECOLOGY

## 2022-12-19 PROCEDURE — 1159F PR MEDICATION LIST DOCUMENTED IN MEDICAL RECORD: ICD-10-PCS | Mod: CPTII,,, | Performed by: OBSTETRICS & GYNECOLOGY

## 2022-12-19 PROCEDURE — 3078F DIAST BP <80 MM HG: CPT | Mod: CPTII,,, | Performed by: OBSTETRICS & GYNECOLOGY

## 2022-12-19 PROCEDURE — 3074F SYST BP LT 130 MM HG: CPT | Mod: CPTII,,, | Performed by: OBSTETRICS & GYNECOLOGY

## 2022-12-19 PROCEDURE — 99395 PREV VISIT EST AGE 18-39: CPT | Mod: S$PBB,,, | Performed by: OBSTETRICS & GYNECOLOGY

## 2022-12-19 PROCEDURE — 3008F PR BODY MASS INDEX (BMI) DOCUMENTED: ICD-10-PCS | Mod: CPTII,,, | Performed by: OBSTETRICS & GYNECOLOGY

## 2022-12-19 PROCEDURE — 99999 PR PBB SHADOW E&M-EST. PATIENT-LVL III: CPT | Mod: PBBFAC,,, | Performed by: OBSTETRICS & GYNECOLOGY

## 2022-12-19 PROCEDURE — 88175 CYTOPATH C/V AUTO FLUID REDO: CPT | Performed by: OBSTETRICS & GYNECOLOGY

## 2022-12-19 PROCEDURE — 99213 OFFICE O/P EST LOW 20 MIN: CPT | Mod: PBBFAC,PN | Performed by: OBSTETRICS & GYNECOLOGY

## 2022-12-19 PROCEDURE — 99999 PR PBB SHADOW E&M-EST. PATIENT-LVL III: ICD-10-PCS | Mod: PBBFAC,,, | Performed by: OBSTETRICS & GYNECOLOGY

## 2022-12-19 PROCEDURE — 3074F PR MOST RECENT SYSTOLIC BLOOD PRESSURE < 130 MM HG: ICD-10-PCS | Mod: CPTII,,, | Performed by: OBSTETRICS & GYNECOLOGY

## 2022-12-19 PROCEDURE — 87624 HPV HI-RISK TYP POOLED RSLT: CPT | Performed by: OBSTETRICS & GYNECOLOGY

## 2022-12-19 PROCEDURE — 3008F BODY MASS INDEX DOCD: CPT | Mod: CPTII,,, | Performed by: OBSTETRICS & GYNECOLOGY

## 2022-12-19 PROCEDURE — 3078F PR MOST RECENT DIASTOLIC BLOOD PRESSURE < 80 MM HG: ICD-10-PCS | Mod: CPTII,,, | Performed by: OBSTETRICS & GYNECOLOGY

## 2022-12-19 NOTE — PROGRESS NOTES
Chief Complaint   Patient presents with    Well Woman       History and Physical:  Patient's last menstrual period was 2022 (exact date).       Stefanie Escalante is a 37 y.o.   Female who presents today for her routine annual GYN exam. The patient has no Gynecology complaints today. No bowel or bladder complaints.       Allergies:   Review of patient's allergies indicates:   Allergen Reactions    Latex Rash       Past Medical History:   Diagnosis Date    Gestational diabetes     Ovarian cyst        Past Surgical History:   Procedure Laterality Date    CHOLECYSTECTOMY      cholescystectomy      ovarian tumor removal         MEDS:   Current Outpatient Medications on File Prior to Visit   Medication Sig Dispense Refill    multivit with min-folic acid 80 mcg Chew Take 1 tablet by mouth once daily.      methylPREDNISolone (MEDROL DOSEPACK) 4 mg tablet use as directed (Patient not taking: Reported on 2022) 21 tablet 0     No current facility-administered medications on file prior to visit.       OB History          3    Para   3    Term                AB        Living   3         SAB        IAB        Ectopic        Multiple        Live Births   3                 Social History     Socioeconomic History    Marital status:    Tobacco Use    Smoking status: Never    Smokeless tobacco: Never   Substance and Sexual Activity    Alcohol use: No     Comment: rarely    Drug use: No    Sexual activity: Yes     Partners: Male     Birth control/protection: Partner-Vasectomy     Social Determinants of Health     Financial Resource Strain: Medium Risk    Difficulty of Paying Living Expenses: Somewhat hard   Food Insecurity: Food Insecurity Present    Worried About Running Out of Food in the Last Year: Sometimes true    Ran Out of Food in the Last Year: Never true   Transportation Needs: No Transportation Needs    Lack of Transportation (Medical): No    Lack of Transportation (Non-Medical): No  "  Physical Activity: Sufficiently Active    Days of Exercise per Week: 4 days    Minutes of Exercise per Session: 40 min   Stress: No Stress Concern Present    Feeling of Stress : Only a little   Social Connections: Unknown    Frequency of Communication with Friends and Family: Twice a week    Frequency of Social Gatherings with Friends and Family: Never    Active Member of Clubs or Organizations: No    Attends Club or Organization Meetings: Never    Marital Status:    Housing Stability: Unknown    Unable to Pay for Housing in the Last Year: No    Unstable Housing in the Last Year: No       Family History   Problem Relation Age of Onset    Cancer Father         Stage 3 lung at 69 yrs.  at 74 yrs.    Diabetes Father     Stroke Mother         In     Diabetes Mother     Breast cancer Neg Hx     Ovarian cancer Neg Hx          Past medical and surgical history reviewed.   I have reviewed the patient's medical history in detail and updated the computerized patient record.        Review of System:   General: no chills, fever, night sweats, weight gain or weight loss  Psychological: no depression or suicidal ideation  Breasts: no new or changing breast lumps, nipple discharge or masses.  Respiratory: no cough, shortness of breath, or wheezing  Cardiovascular: no chest pain or dyspnea on exertion  Gastrointestinal: no abdominal pain, change in bowel habits, or black or bloody stools  Genito-Urinary: no incontinence, urinary frequency/urgency or vulvar/vaginal symptoms, pelvic pain or abnormal vaginal bleeding.  Musculoskeletal: no gait disturbance or muscular weakness      Physical Exam:   /70 (BP Location: Left arm, Patient Position: Sitting, BP Method: Large (Manual))   Ht 5' 2" (1.575 m)   Wt 58.8 kg (129 lb 10.1 oz)   LMP 2022 (Exact Date)   BMI 23.71 kg/m²   Constitutional: She appears alert and responsive. She appears well-developed, well-groomed, and well-nourished. No distress. " Normal weight  HENT:   Head: Normocephalic and atraumatic.   Eyes: Conjunctivae and EOM are normal. No scleral icterus.   Neck: Symmetrical. Normal range of motion. Neck supple. No tracheal deviation present.   Cardiovascular: Normal rate, no rhythm abnormality noted. Extremities without swelling or edema, warm.    Pulmonary/Chest: Normal respiratory Effort. No distress or retractions. She exhibits no tenderness.  Breasts: are symmetrical.   Right breast exhibits no inverted nipple, no mass, no nipple discharge, no skin change and no tenderness.   Left breast exhibits no inverted nipple, no mass, no nipple discharge, no skin change and no tenderness.  Abdominal: Soft. She exhibits no distension, hernias or masses. There is no tenderness. No enlargement of liver edge or spleen.  There is no rebound and no guarding.   Genitourinary:    External rectal exam shows no thrombosed external hemorrhoids, no lesions.     Pelvic exam was performed with patient supine.   No labial fusion, and symmetrical.    There is no rash, lesion or injury on the right labia.   There is no rash, lesion or injury on the left labia.   No bleeding and no signs of injury around the vaginal introitus, urethral meatus is normal size and without prolapse or lesions, urethra well supported. The cervix is visualized with no discharge, lesions or friability.   No vaginal discharge found.    No significant Cystocele, Enterocele or rectocele, and cervix and uterus well supported.   Bimanual exam:   The urethra is normal to palpation and there are no palpable vaginal wall masses.   Uterus is not deviated, not enlarged, not fixed, normal shape and not tender.   Cervix exhibits no motion tenderness.    Right adnexum displays no mass or nodularity and no tenderness.   Left adnexum displays no mass or nodularity and no tenderness.  Musculoskeletal: Normal range of motion.   Lymphadenopathy: No inguinal adenopathy present.   Neurological: She is alert and  oriented to person, place, and time. Coordination normal.   Skin: Skin is warm and dry. She is not diaphoretic. No rashes, lesions or ulcers.   Psychiatric: She has a normal mood and affect, oriented to person, place, and time.      Assessment:   Normal annual GYN exam  1. Gynecologic exam normal  Liquid-Based Pap Smear, Screening    HPV High Risk Genotypes, PCR          Plan:   PAP  Mammogram at 39  Follow up in 1 year.  Patient informed will be contacted with results within 2 weeks. Encouraged to please call back or email if she has not heard from us by then.

## 2022-12-28 ENCOUNTER — PATIENT MESSAGE (OUTPATIENT)
Dept: OBSTETRICS AND GYNECOLOGY | Facility: CLINIC | Age: 37
End: 2022-12-28
Payer: MEDICAID

## 2022-12-28 LAB
FINAL PATHOLOGIC DIAGNOSIS: NORMAL
Lab: NORMAL

## 2022-12-29 LAB
HPV HR 12 DNA SPEC QL NAA+PROBE: NEGATIVE
HPV16 AG SPEC QL: NEGATIVE
HPV18 DNA SPEC QL NAA+PROBE: NEGATIVE

## 2023-01-09 ENCOUNTER — LAB VISIT (OUTPATIENT)
Dept: LAB | Facility: HOSPITAL | Age: 38
End: 2023-01-09
Attending: FAMILY MEDICINE
Payer: MEDICAID

## 2023-01-09 ENCOUNTER — OFFICE VISIT (OUTPATIENT)
Dept: FAMILY MEDICINE | Facility: CLINIC | Age: 38
End: 2023-01-09
Payer: MEDICAID

## 2023-01-09 VITALS
WEIGHT: 130.38 LBS | SYSTOLIC BLOOD PRESSURE: 116 MMHG | RESPIRATION RATE: 14 BRPM | HEART RATE: 79 BPM | BODY MASS INDEX: 23.99 KG/M2 | DIASTOLIC BLOOD PRESSURE: 70 MMHG | HEIGHT: 62 IN

## 2023-01-09 DIAGNOSIS — Z00.00 ROUTINE GENERAL MEDICAL EXAMINATION AT A HEALTH CARE FACILITY: ICD-10-CM

## 2023-01-09 DIAGNOSIS — Z00.00 ROUTINE GENERAL MEDICAL EXAMINATION AT A HEALTH CARE FACILITY: Primary | ICD-10-CM

## 2023-01-09 LAB
ALBUMIN SERPL BCP-MCNC: 3.9 G/DL (ref 3.5–5.2)
ALP SERPL-CCNC: 64 U/L (ref 55–135)
ALT SERPL W/O P-5'-P-CCNC: 12 U/L (ref 10–44)
ANION GAP SERPL CALC-SCNC: 7 MMOL/L (ref 8–16)
AST SERPL-CCNC: 18 U/L (ref 10–40)
BILIRUB SERPL-MCNC: 0.8 MG/DL (ref 0.1–1)
BUN SERPL-MCNC: 14 MG/DL (ref 6–20)
CALCIUM SERPL-MCNC: 9.9 MG/DL (ref 8.7–10.5)
CHLORIDE SERPL-SCNC: 106 MMOL/L (ref 95–110)
CHOLEST SERPL-MCNC: 172 MG/DL (ref 120–199)
CHOLEST/HDLC SERPL: 2.6 {RATIO} (ref 2–5)
CO2 SERPL-SCNC: 26 MMOL/L (ref 23–29)
CREAT SERPL-MCNC: 0.8 MG/DL (ref 0.5–1.4)
ERYTHROCYTE [DISTWIDTH] IN BLOOD BY AUTOMATED COUNT: 12.1 % (ref 11.5–14.5)
EST. GFR  (NO RACE VARIABLE): >60 ML/MIN/1.73 M^2
GLUCOSE SERPL-MCNC: 95 MG/DL (ref 70–110)
HCT VFR BLD AUTO: 42.7 % (ref 37–48.5)
HDLC SERPL-MCNC: 66 MG/DL (ref 40–75)
HDLC SERPL: 38.4 % (ref 20–50)
HGB BLD-MCNC: 14.2 G/DL (ref 12–16)
LDLC SERPL CALC-MCNC: 90.2 MG/DL (ref 63–159)
MCH RBC QN AUTO: 31.4 PG (ref 27–31)
MCHC RBC AUTO-ENTMCNC: 33.3 G/DL (ref 32–36)
MCV RBC AUTO: 95 FL (ref 82–98)
NONHDLC SERPL-MCNC: 106 MG/DL
PLATELET # BLD AUTO: 266 K/UL (ref 150–450)
PMV BLD AUTO: 9.9 FL (ref 9.2–12.9)
POTASSIUM SERPL-SCNC: 5.2 MMOL/L (ref 3.5–5.1)
PROT SERPL-MCNC: 7.3 G/DL (ref 6–8.4)
RBC # BLD AUTO: 4.52 M/UL (ref 4–5.4)
SODIUM SERPL-SCNC: 139 MMOL/L (ref 136–145)
TRIGL SERPL-MCNC: 79 MG/DL (ref 30–150)
TSH SERPL DL<=0.005 MIU/L-ACNC: 1.42 UIU/ML (ref 0.4–4)
WBC # BLD AUTO: 5 K/UL (ref 3.9–12.7)

## 2023-01-09 PROCEDURE — 3078F PR MOST RECENT DIASTOLIC BLOOD PRESSURE < 80 MM HG: ICD-10-PCS | Mod: CPTII,,, | Performed by: FAMILY MEDICINE

## 2023-01-09 PROCEDURE — 80061 LIPID PANEL: CPT | Performed by: FAMILY MEDICINE

## 2023-01-09 PROCEDURE — 99395 PR PREVENTIVE VISIT,EST,18-39: ICD-10-PCS | Mod: 25,S$PBB,, | Performed by: FAMILY MEDICINE

## 2023-01-09 PROCEDURE — 3078F DIAST BP <80 MM HG: CPT | Mod: CPTII,,, | Performed by: FAMILY MEDICINE

## 2023-01-09 PROCEDURE — 85027 COMPLETE CBC AUTOMATED: CPT | Performed by: FAMILY MEDICINE

## 2023-01-09 PROCEDURE — 99999 PR PBB SHADOW E&M-EST. PATIENT-LVL III: CPT | Mod: PBBFAC,,, | Performed by: FAMILY MEDICINE

## 2023-01-09 PROCEDURE — 99999 PR PBB SHADOW E&M-EST. PATIENT-LVL III: ICD-10-PCS | Mod: PBBFAC,,, | Performed by: FAMILY MEDICINE

## 2023-01-09 PROCEDURE — 99213 OFFICE O/P EST LOW 20 MIN: CPT | Mod: PBBFAC,PN | Performed by: FAMILY MEDICINE

## 2023-01-09 PROCEDURE — 1160F PR REVIEW ALL MEDS BY PRESCRIBER/CLIN PHARMACIST DOCUMENTED: ICD-10-PCS | Mod: CPTII,,, | Performed by: FAMILY MEDICINE

## 2023-01-09 PROCEDURE — 3074F PR MOST RECENT SYSTOLIC BLOOD PRESSURE < 130 MM HG: ICD-10-PCS | Mod: CPTII,,, | Performed by: FAMILY MEDICINE

## 2023-01-09 PROCEDURE — 1160F RVW MEDS BY RX/DR IN RCRD: CPT | Mod: CPTII,,, | Performed by: FAMILY MEDICINE

## 2023-01-09 PROCEDURE — 36415 COLL VENOUS BLD VENIPUNCTURE: CPT | Mod: PN | Performed by: FAMILY MEDICINE

## 2023-01-09 PROCEDURE — 3074F SYST BP LT 130 MM HG: CPT | Mod: CPTII,,, | Performed by: FAMILY MEDICINE

## 2023-01-09 PROCEDURE — 1159F MED LIST DOCD IN RCRD: CPT | Mod: CPTII,,, | Performed by: FAMILY MEDICINE

## 2023-01-09 PROCEDURE — 99395 PREV VISIT EST AGE 18-39: CPT | Mod: 25,S$PBB,, | Performed by: FAMILY MEDICINE

## 2023-01-09 PROCEDURE — 3008F PR BODY MASS INDEX (BMI) DOCUMENTED: ICD-10-PCS | Mod: CPTII,,, | Performed by: FAMILY MEDICINE

## 2023-01-09 PROCEDURE — 80053 COMPREHEN METABOLIC PANEL: CPT | Performed by: FAMILY MEDICINE

## 2023-01-09 PROCEDURE — 1159F PR MEDICATION LIST DOCUMENTED IN MEDICAL RECORD: ICD-10-PCS | Mod: CPTII,,, | Performed by: FAMILY MEDICINE

## 2023-01-09 PROCEDURE — 3008F BODY MASS INDEX DOCD: CPT | Mod: CPTII,,, | Performed by: FAMILY MEDICINE

## 2023-01-09 PROCEDURE — 84443 ASSAY THYROID STIM HORMONE: CPT | Performed by: FAMILY MEDICINE

## 2023-01-09 RX ORDER — CHOLECALCIFEROL (VITAMIN D3) 25 MCG
1000 TABLET ORAL DAILY
COMMUNITY

## 2023-01-09 RX ORDER — VITAMIN B COMPLEX
1 CAPSULE ORAL DAILY
COMMUNITY

## 2023-01-09 RX ORDER — ZINC GLUCONATE 50 MG
50 TABLET ORAL DAILY
COMMUNITY

## 2023-01-09 NOTE — PROGRESS NOTES
Subjective:       Patient ID: Stefanie Escalante is a 37 y.o. female.    Chief Complaint: Annual Exam      Stefanie Escalante is in the office for annual exam.    HPI  Medical hx reviewed, no updates.   Past Medical History:   Diagnosis Date    Gestational diabetes     Ovarian cyst        Current Outpatient Medications:     b complex vitamins capsule, Take 1 capsule by mouth once daily., Disp: , Rfl:     multivit with min-folic acid 80 mcg Chew, Take 1 tablet by mouth once daily., Disp: , Rfl:     vitamin D (VITAMIN D3) 1000 units Tab, Take 1,000 Units by mouth once daily., Disp: , Rfl:     zinc gluconate 50 mg tablet, Take 50 mg by mouth once daily., Disp: , Rfl:     methylPREDNISolone (MEDROL DOSEPACK) 4 mg tablet, use as directed, Disp: 21 tablet, Rfl: 0    The ASCVD Risk score (Raine MENESES, et al., 2019) failed to calculate for the following reasons:    The 2019 ASCVD risk score is only valid for ages 40 to 79     Lab Results   Component Value Date    HGBA1C 5.2 01/13/2020     Lab Results   Component Value Date    LDLCALC 83.6 10/11/2021    CREATININE 0.7 10/11/2021   Labs 2021 rev.    Review of Systems   Constitutional:  Negative for fatigue and unexpected weight change.   HENT:  Negative for congestion, postnasal drip and rhinorrhea.         Taste and smell has not fully returned post-covid.   Eyes:  Negative for photophobia and visual disturbance.   Respiratory:  Negative for cough (occ dry cough) and shortness of breath.    Cardiovascular:  Negative for chest pain, palpitations and leg swelling.   Gastrointestinal:  Negative for blood in stool, constipation and diarrhea.        No gerd c/o.   Endocrine: Negative for polydipsia and polyuria.   Genitourinary:  Negative for difficulty urinating, dysuria, hematuria and menstrual problem (regular).   Musculoskeletal:  Negative for arthralgias and joint swelling.        Exercise: weight-training   Skin:  Negative for color change and rash (tends to get skin irritation at  times).   Neurological:  Negative for weakness, light-headedness and headaches.   Psychiatric/Behavioral:  Negative for dysphoric mood and sleep disturbance (some nighttime awakening, not as bad).          Objective:      Physical Exam  Vitals and nursing note reviewed.   Constitutional:       General: She is not in acute distress.     Appearance: Normal appearance. She is well-developed.   HENT:      Head: Normocephalic and atraumatic.      Right Ear: Tympanic membrane and external ear normal.      Left Ear: Tympanic membrane and external ear normal.      Nose: Nose normal.      Mouth/Throat:      Pharynx: No oropharyngeal exudate.   Eyes:      Conjunctiva/sclera: Conjunctivae normal.      Pupils: Pupils are equal, round, and reactive to light.   Neck:      Thyroid: No thyromegaly.   Cardiovascular:      Rate and Rhythm: Normal rate and regular rhythm.   Pulmonary:      Effort: Pulmonary effort is normal. No respiratory distress.      Breath sounds: Normal breath sounds. No wheezing.   Abdominal:      General: Bowel sounds are normal. There is no distension.      Palpations: Abdomen is soft. There is no mass.      Tenderness: There is no abdominal tenderness. There is no guarding or rebound.   Musculoskeletal:         General: No signs of injury.      Cervical back: Neck supple.      Right lower leg: No edema.      Left lower leg: No edema.   Lymphadenopathy:      Cervical: No cervical adenopathy.   Skin:     General: Skin is warm and dry.   Neurological:      General: No focal deficit present.      Mental Status: She is alert and oriented to person, place, and time.      Cranial Nerves: No cranial nerve deficit.   Psychiatric:         Mood and Affect: Mood normal.         Behavior: Behavior normal.           Screening recommendations appropriate to age and health status were reviewed.    Assessment & Plan:    Routine general medical examination at a health care facility  Comments:  anticipatory guidance  reviewed  Orders:  -     CBC Without Differential; Future; Expected date: 01/09/2023  -     Comprehensive Metabolic Panel; Future; Expected date: 01/09/2023  -     TSH; Future; Expected date: 01/09/2023  -     Urinalysis, Reflex to Urine Culture Urine, Clean Catch; Future  -     Lipid Panel; Future; Expected date: 01/09/2023

## 2023-01-10 ENCOUNTER — PATIENT MESSAGE (OUTPATIENT)
Dept: FAMILY MEDICINE | Facility: CLINIC | Age: 38
End: 2023-01-10
Payer: MEDICAID

## 2023-01-11 ENCOUNTER — PATIENT MESSAGE (OUTPATIENT)
Dept: FAMILY MEDICINE | Facility: CLINIC | Age: 38
End: 2023-01-11
Payer: MEDICAID

## 2023-11-07 ENCOUNTER — PATIENT MESSAGE (OUTPATIENT)
Dept: FAMILY MEDICINE | Facility: CLINIC | Age: 38
End: 2023-11-07
Payer: MEDICAID

## 2023-11-09 ENCOUNTER — OFFICE VISIT (OUTPATIENT)
Dept: FAMILY MEDICINE | Facility: CLINIC | Age: 38
End: 2023-11-09
Payer: MEDICAID

## 2023-11-09 VITALS
WEIGHT: 122.13 LBS | HEART RATE: 56 BPM | BODY MASS INDEX: 22.48 KG/M2 | OXYGEN SATURATION: 100 % | HEIGHT: 62 IN | DIASTOLIC BLOOD PRESSURE: 80 MMHG | SYSTOLIC BLOOD PRESSURE: 118 MMHG

## 2023-11-09 DIAGNOSIS — J02.9 SORE THROAT: ICD-10-CM

## 2023-11-09 DIAGNOSIS — J06.9 VIRAL URI WITH COUGH: Primary | ICD-10-CM

## 2023-11-09 LAB
CTP QC/QA: YES
MOLECULAR STREP A: NEGATIVE

## 2023-11-09 PROCEDURE — 3074F PR MOST RECENT SYSTOLIC BLOOD PRESSURE < 130 MM HG: ICD-10-PCS | Mod: CPTII,,,

## 2023-11-09 PROCEDURE — 99999 PR PBB SHADOW E&M-EST. PATIENT-LVL IV: CPT | Mod: PBBFAC,,,

## 2023-11-09 PROCEDURE — 3008F BODY MASS INDEX DOCD: CPT | Mod: CPTII,,,

## 2023-11-09 PROCEDURE — 87651 STREP A DNA AMP PROBE: CPT | Mod: PBBFAC,PN

## 2023-11-09 PROCEDURE — 3079F PR MOST RECENT DIASTOLIC BLOOD PRESSURE 80-89 MM HG: ICD-10-PCS | Mod: CPTII,,,

## 2023-11-09 PROCEDURE — 1159F PR MEDICATION LIST DOCUMENTED IN MEDICAL RECORD: ICD-10-PCS | Mod: CPTII,,,

## 2023-11-09 PROCEDURE — 99214 OFFICE O/P EST MOD 30 MIN: CPT | Mod: S$PBB,,,

## 2023-11-09 PROCEDURE — 1159F MED LIST DOCD IN RCRD: CPT | Mod: CPTII,,,

## 2023-11-09 PROCEDURE — 99214 OFFICE O/P EST MOD 30 MIN: CPT | Mod: PBBFAC,PN

## 2023-11-09 PROCEDURE — 99999PBSHW POCT STREP A MOLECULAR: ICD-10-PCS | Mod: PBBFAC,,,

## 2023-11-09 PROCEDURE — 99999PBSHW POCT STREP A MOLECULAR: Mod: PBBFAC,,,

## 2023-11-09 PROCEDURE — 3074F SYST BP LT 130 MM HG: CPT | Mod: CPTII,,,

## 2023-11-09 PROCEDURE — 99999 PR PBB SHADOW E&M-EST. PATIENT-LVL IV: ICD-10-PCS | Mod: PBBFAC,,,

## 2023-11-09 PROCEDURE — 3008F PR BODY MASS INDEX (BMI) DOCUMENTED: ICD-10-PCS | Mod: CPTII,,,

## 2023-11-09 PROCEDURE — 99214 PR OFFICE/OUTPT VISIT, EST, LEVL IV, 30-39 MIN: ICD-10-PCS | Mod: S$PBB,,,

## 2023-11-09 PROCEDURE — 3079F DIAST BP 80-89 MM HG: CPT | Mod: CPTII,,,

## 2023-11-09 RX ORDER — PREDNISONE 20 MG/1
40 TABLET ORAL DAILY
Qty: 10 TABLET | Refills: 0 | Status: SHIPPED | OUTPATIENT
Start: 2023-11-09 | End: 2023-11-14

## 2023-11-09 RX ORDER — PROMETHAZINE HYDROCHLORIDE AND DEXTROMETHORPHAN HYDROBROMIDE 6.25; 15 MG/5ML; MG/5ML
5 SYRUP ORAL NIGHTLY PRN
Qty: 118 ML | Refills: 0 | Status: SHIPPED | OUTPATIENT
Start: 2023-11-09 | End: 2024-01-08 | Stop reason: ALTCHOICE

## 2023-11-09 NOTE — PROGRESS NOTES
[facility]  [address]  [place] [address]    Subjective    Chief Complaint:   Chief Complaint   Patient presents with    Cough     Cough, sore throat  going on 2 weeks, fatigue       History of Present Illness:     Stefanie Escalante is a(n) 38 y.o. female with past medical history as noted below who presents to the clinic today for cough and sore throat.    Symptoms present x 2 weeks, gradually improving. Pain is most notable when swallowing and coughing. She denies sputum production, shortness of breath, fever, or history recurrent Strep pharyngitis. No other URI symptoms.          Problem List:   Patient Active Problem List   Diagnosis   (none) - all problems resolved or deleted       Current Outpatient Medications:   Current Outpatient Medications   Medication Instructions    b complex vitamins capsule 1 capsule, Oral, Daily    methylPREDNISolone (MEDROL DOSEPACK) 4 mg tablet use as directed    multivit with min-folic acid 80 mcg Chew 1 tablet, Oral, Daily    vitamin D (VITAMIN D3) 1,000 Units, Oral, Daily    zinc gluconate 50 mg, Oral, Daily       Surgical History:   Past Surgical History:   Procedure Laterality Date    CHOLECYSTECTOMY      cholescystectomy      ovarian tumor removal         Family History:   Family History   Problem Relation Age of Onset    Cancer Father         Stage 3 lung at 69 yrs.  at 74 yrs.    Diabetes Father     Stroke Mother         In     Diabetes Mother     Breast cancer Neg Hx     Ovarian cancer Neg Hx        Allergies:   Review of patient's allergies indicates:   Allergen Reactions    Latex Rash       Tobacco Status:   Tobacco Use: Low Risk  (2023)    Patient History     Smoking Tobacco Use: Never     Smokeless Tobacco Use: Never     Passive Exposure: Not on file       Sexual Activity:   Social History     Substance and Sexual Activity   Sexual Activity Yes    Partners: Male    Birth control/protection:  "Partner-Vasectomy       Alcohol Use:   Social History     Substance and Sexual Activity   Alcohol Use No    Comment: rarely         Objective       Vitals:    11/09/23 0924   BP: 118/80   Pulse: (!) 56   SpO2: 100%   Weight: 55.4 kg (122 lb 2.2 oz)   Height: 5' 2" (1.575 m)       Review of Systems   Constitutional:  Positive for malaise/fatigue. Negative for chills and fever.   HENT:  Positive for sore throat. Negative for ear pain and sinus pain.    Respiratory:  Positive for cough. Negative for sputum production, shortness of breath and wheezing.    Cardiovascular:  Negative for chest pain.       Physical Exam  Constitutional:       General: She is not in acute distress.     Appearance: Normal appearance.   HENT:      Head: Normocephalic and atraumatic.      Right Ear: Tympanic membrane, ear canal and external ear normal.      Left Ear: Tympanic membrane, ear canal and external ear normal.      Nose: Nose normal.      Mouth/Throat:      Mouth: Mucous membranes are moist.      Pharynx: Posterior oropharyngeal erythema present. No oropharyngeal exudate.   Eyes:      Pupils: Pupils are equal, round, and reactive to light.   Cardiovascular:      Rate and Rhythm: Normal rate and regular rhythm.      Heart sounds: Normal heart sounds. No murmur heard.  Pulmonary:      Effort: Pulmonary effort is normal. No respiratory distress.      Breath sounds: Normal breath sounds. No wheezing.   Lymphadenopathy:      Cervical: No cervical adenopathy.   Skin:     General: Skin is warm.   Neurological:      Mental Status: She is alert and oriented to person, place, and time.   Psychiatric:         Behavior: Behavior normal.           Assessment and Plan:    1. Viral URI with cough  -     promethazine-dextromethorphan (PROMETHAZINE-DM) 6.25-15 mg/5 mL Syrp; Take 5 mLs by mouth nightly as needed (cough).  Dispense: 118 mL; Refill: 0  -     predniSONE (DELTASONE) 20 MG tablet; Take 2 tablets (40 mg total) by mouth once daily. for 5 days "  Dispense: 10 tablet; Refill: 0    2. Sore throat  -     POCT Strep A, Molecular        Visit summary:    Stefanie Escalante presented today for sore throat and cough.    Presentation consistent with viral upper respiratory illness. Strep test(s) negative today. Covid and influenza not of clinical utility as symptoms are subacute.     Continue to rest and stay well hydrated. Prescribed promethazine DM for cough. We discussed potential side effects of this medication and indications to discontinue this drug. Patient verbalizes understanding.    Recommended over-the-counter Mucinex DM for congestion.     I do not suspect a bacterial cause of symptoms today. Patient was instructed to follow up if symptoms do not improve or worsen and to report to ER with severe symptoms.     Patient was instructed to report to ER if symptoms become severe.    Follow up: if symptoms fail to improve or if they worsen.      Larissa Jang PA-C    This note was created partially with voice dictation software and is prone to errors. This note has been reviewed by me but some errors are inevitable.

## 2023-11-13 ENCOUNTER — PATIENT MESSAGE (OUTPATIENT)
Dept: FAMILY MEDICINE | Facility: CLINIC | Age: 38
End: 2023-11-13
Payer: MEDICAID

## 2023-12-06 ENCOUNTER — TELEPHONE (OUTPATIENT)
Dept: FAMILY MEDICINE | Facility: CLINIC | Age: 38
End: 2023-12-06
Payer: MEDICAID

## 2023-12-06 NOTE — TELEPHONE ENCOUNTER
----- Message from Douglas Novoa sent at 12/6/2023 10:23 AM CST -----  Contact: pt  Type:  Sooner Apoointment Request    Caller is requesting a sooner appointment.  Caller declined first available appointment listed below.  Caller will not accept being placed on the waitlist and is requesting a message be sent to doctor.  Name of Caller:pt   When is the first available appointment?books are closed   Symptoms:annual  Would the patient rather a call back or a response via [facility]? [facility]  Best Call Back Number:[phone]  Additional Information:   Pt Preferred Date Range: 1/29/2024 - 1/29/2024  Pt Preferred Times: Any Time    Pt stated like to schedule an appointment for her   as well for our annual checkups. Anytime after 10 am but at the same time for us.

## 2024-01-08 ENCOUNTER — OFFICE VISIT (OUTPATIENT)
Dept: OBSTETRICS AND GYNECOLOGY | Facility: CLINIC | Age: 39
End: 2024-01-08
Payer: MEDICAID

## 2024-01-08 VITALS
HEIGHT: 62 IN | DIASTOLIC BLOOD PRESSURE: 70 MMHG | WEIGHT: 120.56 LBS | BODY MASS INDEX: 22.19 KG/M2 | SYSTOLIC BLOOD PRESSURE: 116 MMHG

## 2024-01-08 DIAGNOSIS — Z01.419 GYNECOLOGIC EXAM NORMAL: Primary | ICD-10-CM

## 2024-01-08 PROCEDURE — 3078F DIAST BP <80 MM HG: CPT | Mod: CPTII,,, | Performed by: OBSTETRICS & GYNECOLOGY

## 2024-01-08 PROCEDURE — 3008F BODY MASS INDEX DOCD: CPT | Mod: CPTII,,, | Performed by: OBSTETRICS & GYNECOLOGY

## 2024-01-08 PROCEDURE — 99999 PR PBB SHADOW E&M-EST. PATIENT-LVL III: CPT | Mod: PBBFAC,,, | Performed by: OBSTETRICS & GYNECOLOGY

## 2024-01-08 PROCEDURE — 99395 PREV VISIT EST AGE 18-39: CPT | Mod: S$PBB,,, | Performed by: OBSTETRICS & GYNECOLOGY

## 2024-01-08 PROCEDURE — 99213 OFFICE O/P EST LOW 20 MIN: CPT | Mod: PBBFAC,PN | Performed by: OBSTETRICS & GYNECOLOGY

## 2024-01-08 PROCEDURE — 3074F SYST BP LT 130 MM HG: CPT | Mod: CPTII,,, | Performed by: OBSTETRICS & GYNECOLOGY

## 2024-01-08 PROCEDURE — 1159F MED LIST DOCD IN RCRD: CPT | Mod: CPTII,,, | Performed by: OBSTETRICS & GYNECOLOGY

## 2024-01-08 PROCEDURE — 88175 CYTOPATH C/V AUTO FLUID REDO: CPT | Performed by: OBSTETRICS & GYNECOLOGY

## 2024-01-08 NOTE — PROGRESS NOTES
Chief Complaint   Patient presents with    Well Woman       History and Physical:  Patient's last menstrual period was 2023 (exact date).       Stefanie Escalante is a 38 y.o.   Female who presents today for her routine annual GYN exam. The patient has no Gynecology complaints today. No bowel or bladder complaints.       Allergies:   Review of patient's allergies indicates:   Allergen Reactions    Latex Rash       Past Medical History:   Diagnosis Date    Gestational diabetes     Ovarian cyst        Past Surgical History:   Procedure Laterality Date    CHOLECYSTECTOMY      cholescystectomy      ovarian tumor removal         MEDS:   Current Outpatient Medications on File Prior to Visit   Medication Sig Dispense Refill    b complex vitamins capsule Take 1 capsule by mouth once daily.      multivit with min-folic acid 80 mcg Chew Take 1 tablet by mouth once daily.      vitamin D (VITAMIN D3) 1000 units Tab Take 1,000 Units by mouth once daily.      zinc gluconate 50 mg tablet Take 50 mg by mouth once daily.      [DISCONTINUED] promethazine-dextromethorphan (PROMETHAZINE-DM) 6.25-15 mg/5 mL Syrp Take 5 mLs by mouth nightly as needed (cough). 118 mL 0     No current facility-administered medications on file prior to visit.       OB History          3    Para   3    Term                AB        Living   3         SAB        IAB        Ectopic        Multiple        Live Births   3                 Social History     Socioeconomic History    Marital status:    Tobacco Use    Smoking status: Never    Smokeless tobacco: Never   Substance and Sexual Activity    Alcohol use: No     Comment: rarely    Drug use: No    Sexual activity: Yes     Partners: Male     Birth control/protection: Partner-Vasectomy     Social Determinants of Health     Financial Resource Strain: Medium Risk (2022)    Overall Financial Resource Strain (CARDIA)     Difficulty of Paying Living Expenses: Somewhat hard    Food Insecurity: Food Insecurity Present (2022)    Hunger Vital Sign     Worried About Running Out of Food in the Last Year: Sometimes true     Ran Out of Food in the Last Year: Never true   Transportation Needs: No Transportation Needs (2022)    PRAPARE - Transportation     Lack of Transportation (Medical): No     Lack of Transportation (Non-Medical): No   Physical Activity: Sufficiently Active (2022)    Exercise Vital Sign     Days of Exercise per Week: 4 days     Minutes of Exercise per Session: 40 min   Stress: No Stress Concern Present (2022)    [employer] [employer] - Occupational Stress Questionnaire     Feeling of Stress : Only a little   Social Connections: Unknown (2022)    Social Connection and Isolation Panel [NHANES]     Frequency of Communication with Friends and Family: Twice a week     Frequency of Social Gatherings with Friends and Family: Never     Active Member of Clubs or Organizations: No     Attends Club or Organization Meetings: Never     Marital Status:    Housing Stability: Unknown (2022)    Housing Stability Vital Sign     Unable to Pay for Housing in the Last Year: No     Unstable Housing in the Last Year: No       Family History   Problem Relation Age of Onset    Cancer Father         Stage 3 lung at 69 yrs.  at 74 yrs.    Diabetes Father     Stroke Mother         In     Diabetes Mother     Breast cancer Neg Hx     Ovarian cancer Neg Hx          Past medical and surgical history reviewed.   I have reviewed the patient's medical history in detail and updated the computerized patient record.    Review of System:   General: no chills, fever, night sweats, weight gain or weight loss  Psychological: no depression or suicidal ideation  Breasts: no new or changing breast lumps, nipple discharge or masses.  Respiratory: no cough, shortness of breath, or wheezing  Cardiovascular: no chest pain or dyspnea on  "exertion  Gastrointestinal: no abdominal pain, change in bowel habits, or black or bloody stools  Genito-Urinary: no incontinence, urinary frequency/urgency or vulvar/vaginal symptoms, pelvic pain or abnormal vaginal bleeding.  Musculoskeletal: no gait disturbance or muscular weakness      Physical Exam:   /70   Ht 5' 2" (1.575 m)   Wt 54.7 kg (120 lb 9.5 oz)   LMP 12/22/2023 (Exact Date)   BMI 22.06 kg/m²   Constitutional: She appears alert and responsive. She appears well-developed, well-groomed, and well-nourished. No distress. Normal Weight   HENT:   Head: Normocephalic and atraumatic.   Eyes: Conjunctivae and EOM are normal. No scleral icterus.   Neck: Symmetrical. Normal range of motion. Neck supple. No tracheal deviation present.   Cardiovascular: Normal rate, no rhythm abnormality noted. Extremities without swelling or edema, warm.    Pulmonary/Chest: Normal respiratory Effort. No distress or retractions. She exhibits no tenderness.  Breasts: are symmetrical.   Right breast exhibits no inverted nipple, no mass, no nipple discharge, no skin change and no tenderness.   Left breast exhibits no inverted nipple, no mass, no nipple discharge, no skin change and no tenderness.  Abdominal: Soft. She exhibits no distension, hernias or masses. There is no tenderness. No enlargement of liver edge or spleen.  There is no rebound and no guarding.   Genitourinary:    External rectal exam shows no thrombosed external hemorrhoids, no lesions.     Pelvic exam was performed with patient supine.   No labial fusion, and symmetrical.    There is no rash, lesion or injury on the right labia.   There is no rash, lesion or injury on the left labia.   No bleeding and no signs of injury around the vaginal introitus, urethral meatus is normal size and without prolapse or lesions, urethra well supported. The cervix is visualized with no discharge, lesions or friability.   No vaginal discharge found.    No significant " Cystocele, Enterocele or rectocele, and cervix and uterus well supported.   Bimanual exam:   The urethra is normal to palpation and there are no palpable vaginal wall masses.   Uterus is not deviated, not enlarged, not fixed, normal shape and not tender.   Cervix exhibits no motion tenderness.    Right adnexum displays no mass or nodularity and no tenderness.   Left adnexum displays no mass or nodularity and no tenderness.  Musculoskeletal: Normal range of motion.   Neurological: She is alert and oriented to person, place, and time. Coordination normal.   Skin: Skin is warm and dry. She is not diaphoretic. No rashes, lesions or ulcers.   Psychiatric: She has a normal mood and affect, oriented to person, place, and time.      Assessment:   Normal annual GYN exam  Normal menses monthly    Plan:   PAP  Mammogram at 39  Follow up in 1 year.  Patient informed will be contacted with results within 2 weeks. Encouraged to please call back or email if she has not heard from us by then.     No

## 2024-01-11 ENCOUNTER — PATIENT MESSAGE (OUTPATIENT)
Dept: OBSTETRICS AND GYNECOLOGY | Facility: CLINIC | Age: 39
End: 2024-01-11
Payer: MEDICAID

## 2024-01-29 ENCOUNTER — OFFICE VISIT (OUTPATIENT)
Dept: FAMILY MEDICINE | Facility: CLINIC | Age: 39
End: 2024-01-29
Payer: MEDICAID

## 2024-01-29 VITALS
SYSTOLIC BLOOD PRESSURE: 104 MMHG | WEIGHT: 121.06 LBS | DIASTOLIC BLOOD PRESSURE: 70 MMHG | HEART RATE: 58 BPM | OXYGEN SATURATION: 99 % | BODY MASS INDEX: 22.28 KG/M2 | HEIGHT: 62 IN

## 2024-01-29 DIAGNOSIS — Z00.00 ROUTINE GENERAL MEDICAL EXAMINATION AT A HEALTH CARE FACILITY: Primary | ICD-10-CM

## 2024-01-29 PROCEDURE — 3044F HG A1C LEVEL LT 7.0%: CPT | Mod: CPTII,,, | Performed by: FAMILY MEDICINE

## 2024-01-29 PROCEDURE — 3008F BODY MASS INDEX DOCD: CPT | Mod: CPTII,,, | Performed by: FAMILY MEDICINE

## 2024-01-29 PROCEDURE — 99999 PR PBB SHADOW E&M-EST. PATIENT-LVL III: CPT | Mod: PBBFAC,,, | Performed by: FAMILY MEDICINE

## 2024-01-29 PROCEDURE — 1160F RVW MEDS BY RX/DR IN RCRD: CPT | Mod: CPTII,,, | Performed by: FAMILY MEDICINE

## 2024-01-29 PROCEDURE — 1159F MED LIST DOCD IN RCRD: CPT | Mod: CPTII,,, | Performed by: FAMILY MEDICINE

## 2024-01-29 PROCEDURE — 99395 PREV VISIT EST AGE 18-39: CPT | Mod: S$PBB,,, | Performed by: FAMILY MEDICINE

## 2024-01-29 PROCEDURE — 3074F SYST BP LT 130 MM HG: CPT | Mod: CPTII,,, | Performed by: FAMILY MEDICINE

## 2024-01-29 PROCEDURE — 99213 OFFICE O/P EST LOW 20 MIN: CPT | Mod: PBBFAC,PN | Performed by: FAMILY MEDICINE

## 2024-01-29 PROCEDURE — 3078F DIAST BP <80 MM HG: CPT | Mod: CPTII,,, | Performed by: FAMILY MEDICINE

## 2024-01-29 NOTE — PROGRESS NOTES
Subjective:       Patient ID: Stefanie Escalante is a 38 y.o. female.    Chief Complaint: Annual Exam      Stefanie Escalante is in the office for annual exam.    HPI  Medical hx reviewed.  Past Medical History:   Diagnosis Date    Gestational diabetes     Ovarian cyst          Current Outpatient Medications:     b complex vitamins capsule, Take 1 capsule by mouth once daily., Disp: , Rfl:     multivit with min-folic acid 80 mcg Chew, Take 1 tablet by mouth once daily., Disp: , Rfl:     vitamin D (VITAMIN D3) 1000 units Tab, Take 1,000 Units by mouth once daily., Disp: , Rfl:     zinc gluconate 50 mg tablet, Take 50 mg by mouth once daily., Disp: , Rfl:     The ASCVD Risk score (Raine MENESES, et al., 2019) failed to calculate for the following reasons:    The 2019 ASCVD risk score is only valid for ages 40 to 79     Lab Results   Component Value Date    HGBA1C 5.2 01/29/2024    HGBA1C 5.2 01/13/2020     Lab Results   Component Value Date    LDLCALC 90.8 01/29/2024    CREATININE 0.8 01/29/2024   Labs 2023 rev.     Review of Systems   Constitutional:  Negative for fatigue and unexpected weight change.   HENT:  Negative for congestion, postnasal drip and rhinorrhea.    Eyes:  Negative for photophobia and visual disturbance.   Respiratory:  Negative for cough and shortness of breath.    Cardiovascular:  Negative for chest pain, palpitations and leg swelling.   Gastrointestinal:  Negative for blood in stool, constipation and diarrhea.        No gerd c/o.   Genitourinary:  Negative for difficulty urinating, dysuria, frequency (nighttime 0-1), hematuria and menstrual problem (regular).   Musculoskeletal:  Negative for arthralgias and joint swelling.        Exercise: weight-training   Skin:  Negative for color change and rash.   Neurological:  Negative for dizziness, weakness, light-headedness and headaches.   Psychiatric/Behavioral:  Negative for dysphoric mood and sleep disturbance (overall, ok).        Objective:      Physical  Exam  Vitals and nursing note reviewed.   Constitutional:       General: She is not in acute distress.     Appearance: Normal appearance. She is well-developed.   HENT:      Head: Normocephalic and atraumatic.      Right Ear: Tympanic membrane and external ear normal.      Left Ear: Tympanic membrane and external ear normal.      Nose: Nose normal.      Mouth/Throat:      Pharynx: No oropharyngeal exudate.   Eyes:      Conjunctiva/sclera: Conjunctivae normal.      Pupils: Pupils are equal, round, and reactive to light.   Neck:      Thyroid: No thyromegaly.   Cardiovascular:      Rate and Rhythm: Normal rate and regular rhythm.   Pulmonary:      Effort: Pulmonary effort is normal. No respiratory distress.      Breath sounds: Normal breath sounds. No wheezing.   Abdominal:      General: Bowel sounds are normal. There is no distension.      Palpations: Abdomen is soft. There is no mass.      Tenderness: There is no abdominal tenderness. There is no guarding or rebound.   Musculoskeletal:         General: Normal range of motion.      Cervical back: Normal range of motion and neck supple.      Right lower leg: No edema.      Left lower leg: No edema.   Lymphadenopathy:      Cervical: No cervical adenopathy.   Skin:     General: Skin is warm and dry.   Neurological:      General: No focal deficit present.      Mental Status: She is alert and oriented to person, place, and time.      Cranial Nerves: No cranial nerve deficit.   Psychiatric:         Mood and Affect: Mood normal.         Behavior: Behavior normal.             Screening recommendations appropriate to age and health status were reviewed.    Assessment & Plan:    Routine general medical examination at a health care facility  Comments:  anticipatory guidance reviewed  Orders:  -     CBC Without Differential; Future; Expected date: 01/29/2024  -     Comprehensive Metabolic Panel; Future; Expected date: 01/29/2024  -     Hemoglobin A1C; Future; Expected date:  01/29/2024  -     Lipid Panel; Future; Expected date: 01/29/2024  -     TSH; Future; Expected date: 01/29/2024  -     Urinalysis, Reflex to Urine Culture Urine, Clean Catch; Future

## 2024-02-28 DIAGNOSIS — Z12.31 OTHER SCREENING MAMMOGRAM: ICD-10-CM

## 2024-03-18 ENCOUNTER — HOSPITAL ENCOUNTER (OUTPATIENT)
Dept: RADIOLOGY | Facility: HOSPITAL | Age: 39
Discharge: HOME | End: 2024-03-18
Attending: FAMILY MEDICINE
Payer: MEDICAID

## 2024-03-18 DIAGNOSIS — Z12.31 OTHER SCREENING MAMMOGRAM: ICD-10-CM

## 2024-03-18 PROCEDURE — 77067 SCR MAMMO BI INCL CAD: CPT | Mod: TC,PO

## 2024-03-18 PROCEDURE — 77063 BREAST TOMOSYNTHESIS BI: CPT | Mod: 26,,, | Performed by: RADIOLOGY

## 2024-03-18 PROCEDURE — 77067 SCR MAMMO BI INCL CAD: CPT | Mod: 26,,, | Performed by: RADIOLOGY

## 2024-03-25 ENCOUNTER — PATIENT MESSAGE (OUTPATIENT)
Dept: FAMILY MEDICINE | Facility: CLINIC | Age: 39
End: 2024-03-25
Payer: MEDICAID

## 2024-03-25 ENCOUNTER — E-VISIT (OUTPATIENT)
Dept: FAMILY MEDICINE | Facility: CLINIC | Age: 39
End: 2024-03-25
Payer: MEDICAID

## 2024-03-25 DIAGNOSIS — R21 RASH: Primary | ICD-10-CM

## 2024-03-25 PROCEDURE — 99422 OL DIG E/M SVC 11-20 MIN: CPT | Mod: S$PBB,,, | Performed by: FAMILY MEDICINE

## 2024-03-25 RX ORDER — METHYLPREDNISOLONE 4 MG/1
TABLET ORAL
Qty: 21 EACH | Refills: 0 | Status: SHIPPED | OUTPATIENT
Start: 2024-03-25 | End: 2024-04-15

## 2024-03-25 NOTE — PROGRESS NOTES
Patient ID: Stefanie Escalante is a 39 y.o. female.    Chief Complaint: Rash (Entered automatically based on patient selection in Patient Portal.)    The patient initiated a request through [facility] on 3/25/2024 for evaluation and management with a chief complaint of Rash (Entered automatically based on patient selection in Patient Portal.)     I evaluated the questionnaire submission on 3/25/24.    Ohs Peq Evisit Rash    3/25/2024  2:18 PM CDT - Filed by Patient   Do you agree to participate in an E-Visit? Yes   If you have any of the following symptoms, please present to your local ER or call 911:  I acknowledge   Choose the state of your primary residence [place]   What is the main issue that you would like for your doctor to address today? Rash or hives   Are you able to take your vital signs? No   Are you pregnant, could you be pregnant, or are you breast feeding? None of the above   How would you describe your skin problem? Lump or bump   When did your symptoms first appear? 3/25/2024   Where is it located?  Face;  Neck;  Chest;  Back;  Arm(s);  Groin;  Leg(s);  Foot/Feet;  Sun exposed areas;  Clothes covered areas   Does it itch? Yes   Does it hurt? No   Is there discharge or drainage? No   Is there bleeding? No   Describe the character Spots;  Raised;  Solid or firm   Describe the color Red   Has it changed over time? Shrunk in size   Frequency of skin problem Fluctuates at random   Duration of the skin problem (how long does it stay when it is present) Hours   I have had a new exposure to No new exposures   I have had a new exposure to No new exposures   What have you used to treat the skin problem? Benadryl and crave itch lotion   If you have used anything for treatment, has it helped the symptoms? Yes   Other generalized symptoms that you associate with the rash No other symptoms   Provide any information you feel is important to your history not asked above Allergic to something in the grass. I broke out  after cutting grass. Its still hot and red. It itches and burns.   At least one photo is required for treatment to be provided. You can upload a maximum of three photos of the affected area.           Encounter Diagnosis   Name Primary?    Rash Yes        No orders of the defined types were placed in this encounter.     Medications Ordered This Encounter   Medications    methylPREDNISolone (MEDROL DOSEPACK) 4 mg tablet     Sig: use as directed     Dispense:  21 each     Refill:  0        No follow-ups on file.      E-Visit Time Trackin min.

## 2024-03-26 NOTE — TELEPHONE ENCOUNTER
Evisit would be fine. Can continue benadryl and topical steroid in the interim to keep it from worsening.

## 2024-12-29 ENCOUNTER — PATIENT MESSAGE (OUTPATIENT)
Dept: FAMILY MEDICINE | Facility: CLINIC | Age: 39
End: 2024-12-29
Payer: MEDICAID

## 2024-12-30 ENCOUNTER — E-VISIT (OUTPATIENT)
Dept: URGENT CARE | Facility: CLINIC | Age: 39
End: 2024-12-30
Payer: MEDICAID

## 2024-12-30 DIAGNOSIS — L29.9 PRURITUS: ICD-10-CM

## 2024-12-30 DIAGNOSIS — R21 RASH: Primary | ICD-10-CM

## 2024-12-30 RX ORDER — CETIRIZINE HYDROCHLORIDE 10 MG/1
10 TABLET ORAL 2 TIMES DAILY PRN
Qty: 10 TABLET | Refills: 0 | Status: SHIPPED | OUTPATIENT
Start: 2024-12-30 | End: 2025-01-04

## 2024-12-30 RX ORDER — CLINDAMYCIN HYDROCHLORIDE 300 MG/1
300 CAPSULE ORAL 3 TIMES DAILY
Qty: 21 CAPSULE | Refills: 0 | Status: SHIPPED | OUTPATIENT
Start: 2024-12-30 | End: 2025-01-06

## 2024-12-30 RX ORDER — TRIAMCINOLONE ACETONIDE 1 MG/G
CREAM TOPICAL
Qty: 60 G | Refills: 0 | Status: SHIPPED | OUTPATIENT
Start: 2024-12-30

## 2024-12-30 RX ORDER — MUPIROCIN 20 MG/G
OINTMENT TOPICAL 3 TIMES DAILY
Qty: 30 G | Refills: 0 | Status: SHIPPED | OUTPATIENT
Start: 2024-12-30

## 2024-12-30 NOTE — PROGRESS NOTES
Patient ID: Stefanie Escalante is a 39 y.o. female.    Chief Complaint: Rash (Entered automatically based on patient selection in CME.)          274}  The patient initiated a request through CME on 12/30/2024 for evaluation and management with a chief complaint of Rash (Entered automatically based on patient selection in CME.)     I evaluated the questionnaire submission on 12/30/2024 .    Total Time (in minutes): 12     Ohs Peq Evisit Rash    12/30/2024  1:59 PM CST - Filed by Patient   Do you agree to participate in an E-Visit? Yes   If you have any of the following symptoms, please present to your local emergency room or call 911:  I acknowledge   Choose the state of your primary residence [place]   Do you have any of the following pregnancy-related conditions? None   What is the main issue you would like addressed today? Red Ant bite   How would you describe your skin problem? Growth   When did your symptoms first appear? 12/28/2024   Where is it located?  Foot/Feet   Does it itch? Yes   Does it hurt? Yes   Where is the pain located? Where the skin change is noted   The pain came on: Gradually   The pain has the character of: Aching   Frequency of the pain (How often does it appear)? Comes and goes   Please select the face that most closely captures your pain level: 6   Is there discharge or drainage? No   Is there bleeding? No   Describe the character Raised;  Solid or firm   Describe the color Red   Has it changed over time? Grown in size   Frequency of skin problem Always there   Duration of the skin problem (how long does it stay when it is present) Days   I have had a new exposure to Insects/bugs;  No new exposures   I have had a new exposure to No new exposures   What have you used to treat the skin problem? Rubbing alcohol and vicks vapor rub on skin. Took benadryl to help itch but didnt work. Took aleve to help inflammation   If you have used anything for treatment, has it helped the symptoms?  Maybe   Other generalized symptoms that you associate with the rash No other symptoms   Provide any additional information you feel is important. Just a red ant bite by ankle and it swollen my whole ankle. Achy pain and itching. Hot burning sensation,skin tightness. Seems like fluid building up around ankle. Can see sock/shoe indentation   At least one photo is required for treatment to be provided. You can upload a maximum of three photos of the affected area.     Are you able to take your vital signs? No          There are no active problems to display for this patient.     Recent Labs Obtained:  Lab Results   Component Value Date    WBC 6.18 01/29/2024    HGB 13.5 01/29/2024    HCT 40.3 01/29/2024    MCV 90 01/29/2024     01/29/2024     01/29/2024    K 4.2 01/29/2024    GLU 86 01/29/2024    CREATININE 0.8 01/29/2024    EGFRNORACEVR >60.0 01/29/2024    HGBA1C 5.2 01/29/2024    TSH 1.189 01/29/2024      Review of patient's allergies indicates:   Allergen Reactions    Latex Rash       Encounter Diagnoses   Name Primary?    Rash Yes    Pruritus         No orders of the defined types were placed in this encounter.     Medications Ordered This Encounter   Medications    cetirizine (ZYRTEC) 10 MG tablet     Sig: Take 1 tablet (10 mg total) by mouth 2 (two) times daily as needed (itching).     Dispense:  10 tablet     Refill:  0     Off label higher dose aware    clindamycin (CLEOCIN) 300 MG capsule     Sig: Take 1 capsule (300 mg total) by mouth 3 (three) times daily. for 7 days     Dispense:  21 capsule     Refill:  0    triamcinolone acetonide 0.1% (KENALOG) 0.1 % cream     Sig: AAA bid     Dispense:  60 g     Refill:  0        E-Visit Time Tracking:    Day 1 Time (in minutes): 12    Total Time (in minutes): 12      274}

## 2025-01-16 ENCOUNTER — TELEPHONE (OUTPATIENT)
Dept: FAMILY MEDICINE | Facility: CLINIC | Age: 40
End: 2025-01-16
Payer: MEDICAID

## 2025-01-16 NOTE — TELEPHONE ENCOUNTER
----- Message from DocSend sent at 1/16/2025  9:42 AM CST -----  Regarding: Sooner Appointment Request  Contact: patient  Type:  Sooner Appointment Request    Name of Caller:  patient   When is the first available appointment?  none  Symptoms:  Annual    Best Call Back Number:  [phone]  Additional Information:  Patient and her  would like to be seen at the same day. Please call and advise thank you.

## 2025-02-10 ENCOUNTER — LAB VISIT (OUTPATIENT)
Dept: LAB | Facility: HOSPITAL | Age: 40
End: 2025-02-10
Attending: FAMILY MEDICINE
Payer: MEDICAID

## 2025-02-10 ENCOUNTER — OFFICE VISIT (OUTPATIENT)
Dept: FAMILY MEDICINE | Facility: CLINIC | Age: 40
End: 2025-02-10
Payer: MEDICAID

## 2025-02-10 VITALS
DIASTOLIC BLOOD PRESSURE: 70 MMHG | WEIGHT: 120.38 LBS | HEIGHT: 62 IN | SYSTOLIC BLOOD PRESSURE: 100 MMHG | BODY MASS INDEX: 22.15 KG/M2 | OXYGEN SATURATION: 99 % | HEART RATE: 62 BPM

## 2025-02-10 DIAGNOSIS — R21 RASH: ICD-10-CM

## 2025-02-10 DIAGNOSIS — Z12.31 OTHER SCREENING MAMMOGRAM: ICD-10-CM
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
## 2025-02-10 DIAGNOSIS — Z00.00 ROUTINE GENERAL MEDICAL EXAMINATION AT A HEALTH CARE FACILITY: ICD-10-CM

## 2025-02-10 DIAGNOSIS — Z00.00 ROUTINE GENERAL MEDICAL EXAMINATION AT A HEALTH CARE FACILITY: Primary | ICD-10-CM

## 2025-02-10 LAB
ALBUMIN SERPL BCP-MCNC: 4.1 G/DL (ref 3.5–5.2)
ALP SERPL-CCNC: 45 U/L (ref 40–150)
ALT SERPL W/O P-5'-P-CCNC: 20 U/L (ref 10–44)
ANION GAP SERPL CALC-SCNC: 12 MMOL/L (ref 8–16)
AST SERPL-CCNC: 21 U/L (ref 10–40)
BILIRUB SERPL-MCNC: 0.7 MG/DL (ref 0.1–1)
BUN SERPL-MCNC: 10 MG/DL (ref 6–20)
CALCIUM SERPL-MCNC: 9.3 MG/DL (ref 8.7–10.5)
CHLORIDE SERPL-SCNC: 104 MMOL/L (ref 95–110)
CHOLEST SERPL-MCNC: 167 MG/DL (ref 120–199)
CHOLEST/HDLC SERPL: 2.4 {RATIO} (ref 2–5)
CO2 SERPL-SCNC: 24 MMOL/L (ref 23–29)
CREAT SERPL-MCNC: 0.8 MG/DL (ref 0.5–1.4)
EST. GFR  (NO RACE VARIABLE): >60 ML/MIN/1.73 M^2
ESTIMATED AVG GLUCOSE: 100 MG/DL (ref 68–131)
GLUCOSE SERPL-MCNC: 82 MG/DL (ref 70–110)
HBA1C MFR BLD: 5.1 % (ref 4–5.6)
HDLC SERPL-MCNC: 70 MG/DL (ref 40–75)
HDLC SERPL: 41.9 % (ref 20–50)
LDLC SERPL CALC-MCNC: 86 MG/DL (ref 63–159)
NONHDLC SERPL-MCNC: 97 MG/DL
POTASSIUM SERPL-SCNC: 4 MMOL/L (ref 3.5–5.1)
PROT SERPL-MCNC: 7.5 G/DL (ref 6–8.4)
SODIUM SERPL-SCNC: 140 MMOL/L (ref 136–145)
TRIGL SERPL-MCNC: 55 MG/DL (ref 30–150)
TSH SERPL DL<=0.005 MIU/L-ACNC: 0.99 UIU/ML (ref 0.4–4)

## 2025-02-10 PROCEDURE — 85027 COMPLETE CBC AUTOMATED: CPT | Performed by: FAMILY MEDICINE

## 2025-02-10 PROCEDURE — 83036 HEMOGLOBIN GLYCOSYLATED A1C: CPT | Performed by: FAMILY MEDICINE

## 2025-02-10 PROCEDURE — 80061 LIPID PANEL: CPT | Performed by: FAMILY MEDICINE

## 2025-02-10 PROCEDURE — 36415 COLL VENOUS BLD VENIPUNCTURE: CPT | Mod: PN | Performed by: FAMILY MEDICINE

## 2025-02-10 PROCEDURE — 99214 OFFICE O/P EST MOD 30 MIN: CPT | Mod: PBBFAC,PN | Performed by: FAMILY MEDICINE

## 2025-02-10 PROCEDURE — 80053 COMPREHEN METABOLIC PANEL: CPT | Performed by: FAMILY MEDICINE

## 2025-02-10 PROCEDURE — 84443 ASSAY THYROID STIM HORMONE: CPT | Performed by: FAMILY MEDICINE

## 2025-02-10 RX ORDER — TRIAMCINOLONE ACETONIDE 5 MG/G
CREAM TOPICAL 2 TIMES DAILY
Qty: 30 G | Refills: 3 | Status: SHIPPED | OUTPATIENT
Start: 2025-02-10

## 2025-02-10 RX ORDER — CLINDAMYCIN HYDROCHLORIDE 300 MG/1
300 CAPSULE ORAL 3 TIMES DAILY
Qty: 21 CAPSULE | Refills: 0 | Status: SHIPPED | OUTPATIENT
Start: 2025-02-10 | End: 2025-02-17

## 2025-02-10 NOTE — PROGRESS NOTES
Chief Complaint:  Chief Complaint   Patient presents with    Annual Exam        HPI:  Stefanie is a 40 y.o. year old     History of Present Illness    CHIEF COMPLAINT:  Stefanie presents today for follow up    CURRENT SYMPTOMS:  She experiences intermittent lightheadedness upon standing quickly, particularly when responding to her children. She reports abdominal tenderness, which she attributes to possible hunger and gas pains.    ALLERGIC REACTIONS:  She develops hives when cutting grass, with recent episodes producing larger welts than previous occurrences. Previous episodes have responded to Benadryl, with two tablets taken during one occurrence. She attempts to cut grass during cooler weather to mitigate symptoms. She experiences localized swelling within 24 hours of ant bites, with associated pain on walking and touching the affected area. She reports being highly susceptible to insect bites but denies any systemic symptoms. A recent ant bite required antibiotic treatment.    FAMILY HISTORY:  Mother recently had colitis requiring hospitalization due to dehydration, which led to acute kidney injury.      ROS:  Gastrointestinal: +abdominal pain  Neurological: +lightheadedness           Review of Systems   Constitutional:  Negative for fatigue and unexpected weight change.   HENT:  Negative for congestion, postnasal drip and rhinorrhea.    Respiratory:  Negative for cough and shortness of breath.    Cardiovascular:  Negative for chest pain, palpitations and leg swelling.   Gastrointestinal:  Negative for blood in stool, constipation and diarrhea.        No gerd c/o.   Genitourinary:  Negative for difficulty urinating, dysuria, frequency (nighttime 0-1), hematuria and menstrual problem (regular).   Musculoskeletal:  Negative for arthralgias and joint swelling.        Exercise: weight-training   Neurological:  Negative for dizziness, weakness, light-headedness and headaches.   Psychiatric/Behavioral:  Negative for dysphoric  "mood and sleep disturbance (overall, ok).          Past Medical History:  Past Medical History:   Diagnosis Date    Gestational diabetes     Ovarian cyst          Vitals:  Vitals:    02/10/25 1313   BP: 100/70   Pulse: 62   SpO2: 99%   Weight: 54.6 kg (120 lb 5.9 oz)   Height: 5' 2" (1.575 m)   PainSc: 0-No pain       BP Readings from Last 5 Encounters:   02/10/25 100/70   01/29/24 104/70   01/08/24 116/70   11/09/23 118/80   01/09/23 116/70       The 10-year ASCVD risk score (Raine MENESES, et al., 2019) is: 0.2%    Values used to calculate the score:      Age: 40 years      Sex: Female      Is Non- : No      Diabetic: No      Tobacco smoker: No      Systolic Blood Pressure: 100 mmHg      Is BP treated: No      HDL Cholesterol: 70 mg/dL      Total Cholesterol: 167 mg/dL      Physical Exam:  Physical Exam  Vitals and nursing note reviewed.   Constitutional:       General: She is not in acute distress.     Appearance: Normal appearance. She is well-developed.   HENT:      Head: Normocephalic and atraumatic.      Right Ear: Tympanic membrane and external ear normal.      Left Ear: Tympanic membrane and external ear normal.      Nose: Nose normal.      Mouth/Throat:      Pharynx: No oropharyngeal exudate.   Eyes:      General: No scleral icterus.        Right eye: No discharge.         Left eye: No discharge.      Conjunctiva/sclera: Conjunctivae normal.      Pupils: Pupils are equal, round, and reactive to light.   Neck:      Thyroid: No thyromegaly.   Cardiovascular:      Rate and Rhythm: Normal rate and regular rhythm.   Pulmonary:      Effort: Pulmonary effort is normal. No respiratory distress.      Breath sounds: Normal breath sounds. No wheezing.   Abdominal:      General: Bowel sounds are normal. There is no distension.      Palpations: Abdomen is soft. There is no mass.      Tenderness: There is no abdominal tenderness (mild ttp LLQ). There is no guarding or rebound.   Musculoskeletal:    "      General: No deformity or signs of injury.      Cervical back: Neck supple.      Right lower leg: No edema.      Left lower leg: No edema.   Lymphadenopathy:      Cervical: No cervical adenopathy.   Skin:     General: Skin is warm and dry.      Findings: No rash.   Neurological:      General: No focal deficit present.      Mental Status: She is alert and oriented to person, place, and time.      Cranial Nerves: No cranial nerve deficit.   Psychiatric:         Mood and Affect: Mood normal.         Behavior: Behavior normal.             Assessment & Plan:  Routine general medical examination at a health care facility  Comments:  anticipatory guidance reviewed  Orders:  -     CBC Without Differential; Future; Expected date: 02/10/2025  -     Comprehensive Metabolic Panel; Future; Expected date: 02/10/2025  -     Hemoglobin A1C; Future; Expected date: 02/10/2025  -     Lipid Panel; Future; Expected date: 02/10/2025  -     TSH; Future; Expected date: 02/10/2025    Bug bite with infection, initial encounter  -     triamcinolone acetonide 0.5% (KENALOG) 0.5 % Crea; Apply topically 2 (two) times daily.  Dispense: 30 g; Refill: 3  -     Ambulatory referral/consult to Allergy; Future; Expected date: 02/17/2025  -     clindamycin (CLEOCIN) 300 MG capsule; Take 1 capsule (300 mg total) by mouth 3 (three) times daily. for 7 days  Dispense: 21 capsule; Refill: 0    Rash  -     triamcinolone acetonide 0.5% (KENALOG) 0.5 % Crea; Apply topically 2 (two) times daily.  Dispense: 30 g; Refill: 3  -     Ambulatory referral/consult to Allergy; Future; Expected date: 02/17/2025  -     clindamycin (CLEOCIN) 300 MG capsule; Take 1 capsule (300 mg total) by mouth 3 (three) times daily. for 7 days  Dispense: 21 capsule; Refill: 0    Other screening mammogram  -     Mammo Digital Screening Bilat w/ Varghese; Future; Expected date: 02/10/2025         Assessment & Plan    ORDERS:   CBC ordered as routine lab work   Mammogram  ordered    IMPRESSION:  Evaluated patient's response to ant bites and recurrent skin reactions  Considered potential for systemic allergy given history of hives and full-body reactions  Assessed need for allergy testing to rule out severe ant bite allergy  Reviewed breast cancer screening guidelines and decided to initiate mammogram at age 39  Evaluated abdominal tenderness, considering potential causes such as ovulation discomfort    PLAN SUMMARY:   Oral clindamycin prescribed for worsening ant bite symptoms   Continued mupirocin ointment for ant bites   Mammogram ordered   CBC ordered as routine lab work   Topical steroid cream prescribed to be mixed with mupirocin for ant bites   Referred to allergist for potential severe ant bite allergy evaluation   Follow up in 1 year for annual visit unless urgent issues arise    PATIENT EDUCATION:   Discussed potential factors contributing to increased breast cancer incidence, including lifestyle factors like smoking, alcohol consumption, and obesity   Explained importance of electrolyte balance and salt intake for managing postural lightheadedness, especially given patient's low normal blood pressure   Discussed potential causes of abdominal discomfort, including ovulation-related pain    ACTION ITEMS/LIFESTYLE:   Pat to mix Celtic salt in water for electrolyte balance   For grass cutting:   - Pre-medicate with Benadryl before cutting grass   - Take a lukewarm shower immediately after cutting grass to cool skin   For weightlifting:   - Use lower weights when trying new shoulder exercises, especially overhead movements   - Check form carefully when starting new exercises   Keep Benadryl (including chewable children's Benadryl) in bag, car, and at home for immediate use if bitten    MEDICATIONS:   Started topical steroid cream to be mixed with mupirocin ointment, apply 3 times daily when ant bite symptoms start   oral clindamycin to be filled and kept at home, start if ant  bite symptoms worsen after 24 hours of topical treatment   Continued mupirocin ointment for ant bites    REFERRALS:   Referred to allergist on an as-needed basis for potential severe ant bite allergy evaluation    FOLLOW UP:   Follow up in 1 year for annual visit unless there are glaring issues   Schedule mammogram at patient's convenience       This note was generated with the assistance of ambient listening technology. Verbal consent was obtained by the patient and accompanying visitor(s) for the recording of patient appointment to facilitate this note. I attest to having reviewed and edited the generated note for accuracy, though some syntax or spelling errors may persist. Please contact the author of this note for any clarification.

## 2025-02-11 LAB
ERYTHROCYTE [DISTWIDTH] IN BLOOD BY AUTOMATED COUNT: 11.9 % (ref 11.5–14.5)
HCT VFR BLD AUTO: 42.5 % (ref 37–48.5)
HGB BLD-MCNC: 13.8 G/DL (ref 12–16)
MCH RBC QN AUTO: 30.5 PG (ref 27–31)
MCHC RBC AUTO-ENTMCNC: 32.5 G/DL (ref 32–36)
MCV RBC AUTO: 94 FL (ref 82–98)
PLATELET # BLD AUTO: 269 K/UL (ref 150–450)
PMV BLD AUTO: 10.3 FL (ref 9.2–12.9)
RBC # BLD AUTO: 4.53 M/UL (ref 4–5.4)
WBC # BLD AUTO: 5.51 K/UL (ref 3.9–12.7)

## 2025-04-28 ENCOUNTER — OFFICE VISIT (OUTPATIENT)
Dept: OBSTETRICS AND GYNECOLOGY | Facility: CLINIC | Age: 40
End: 2025-04-28
Payer: MEDICAID

## 2025-04-28 VITALS
DIASTOLIC BLOOD PRESSURE: 80 MMHG | WEIGHT: 121.06 LBS | SYSTOLIC BLOOD PRESSURE: 120 MMHG | BODY MASS INDEX: 22.14 KG/M2

## 2025-04-28 DIAGNOSIS — Z12.4 SCREENING FOR CERVICAL CANCER: Primary | ICD-10-CM

## 2025-04-28 DIAGNOSIS — R10.32 LLQ PAIN: ICD-10-CM

## 2025-04-28 PROCEDURE — 99213 OFFICE O/P EST LOW 20 MIN: CPT | Mod: PBBFAC,PN | Performed by: OBSTETRICS & GYNECOLOGY

## 2025-04-28 PROCEDURE — 3044F HG A1C LEVEL LT 7.0%: CPT | Mod: CPTII,,, | Performed by: OBSTETRICS & GYNECOLOGY

## 2025-04-28 PROCEDURE — 3074F SYST BP LT 130 MM HG: CPT | Mod: CPTII,,, | Performed by: OBSTETRICS & GYNECOLOGY

## 2025-04-28 PROCEDURE — 1159F MED LIST DOCD IN RCRD: CPT | Mod: CPTII,,, | Performed by: OBSTETRICS & GYNECOLOGY

## 2025-04-28 PROCEDURE — 99396 PREV VISIT EST AGE 40-64: CPT | Mod: S$PBB,,, | Performed by: OBSTETRICS & GYNECOLOGY

## 2025-04-28 PROCEDURE — 3079F DIAST BP 80-89 MM HG: CPT | Mod: CPTII,,, | Performed by: OBSTETRICS & GYNECOLOGY

## 2025-04-28 PROCEDURE — 3008F BODY MASS INDEX DOCD: CPT | Mod: CPTII,,, | Performed by: OBSTETRICS & GYNECOLOGY

## 2025-04-28 PROCEDURE — 99999 PR PBB SHADOW E&M-EST. PATIENT-LVL III: CPT | Mod: PBBFAC,,, | Performed by: OBSTETRICS & GYNECOLOGY

## 2025-04-28 RX ORDER — MULTIVITAMIN WITH IRON
TABLET ORAL DAILY
COMMUNITY

## 2025-04-28 NOTE — PROGRESS NOTES
Chief Complaint   Patient presents with    Annual Exam       History and Physical:  Patient's last menstrual period was 2025 (exact date).       Stefanie Escalante is a 40 y.o.   Female who presents today for her routine annual GYN exam. The patient has no Gynecology complaints today. Occ LLQ pains, h/o left ovarian cyst - vasectomy for Birth control       Allergies:   Review of patient's allergies indicates:   Allergen Reactions    Latex Rash       Past Medical History:   Diagnosis Date    Gestational diabetes     Ovarian cyst        Past Surgical History:   Procedure Laterality Date    CHOLECYSTECTOMY      cholescystectomy      ovarian tumor removal         MEDS: Medications Ordered Prior to Encounter[1]    OB History          3    Para   3    Term                AB        Living   3         SAB        IAB        Ectopic        Multiple        Live Births   3                 Social History[2]    Family History   Problem Relation Name Age of Onset    Stroke Mother Nimco Aggarwal         In     Diabetes Mother Nimco Aggarwal     Lung cancer Father Kirill Escalante         Stage 3 lung at 69 yrs.  at 74 yrs.    Diabetes Father Kirill Escalante     Breast cancer Neg Hx      Ovarian cancer Neg Hx           Past medical and surgical history reviewed.   I have reviewed the patient's medical history in detail and updated the computerized patient record.    Review of System:   General: no chills, fever, night sweats, weight gain or weight loss  Psychological: no depression or suicidal ideation  Breasts: no new or changing breast lumps, nipple discharge or masses.  Respiratory: no cough, shortness of breath, or wheezing  Cardiovascular: no chest pain or dyspnea on exertion  Gastrointestinal: no abdominal pain, change in bowel habits, or black or bloody stools  Genito-Urinary: no incontinence, urinary frequency/urgency or vulvar/vaginal symptoms, pelvic pain or abnormal vaginal  bleeding.  Musculoskeletal: no gait disturbance or muscular weakness      Physical Exam:   /80 (Patient Position: Sitting)   Wt 54.9 kg (121 lb 0.5 oz)   LMP 04/11/2025 (Exact Date)   BMI 22.14 kg/m²   Constitutional: She appears alert and responsive. She appears well-developed, well-groomed, and well-nourished. No distress. Normal Weight   HENT:   Head: Normocephalic and atraumatic.   Eyes: Conjunctivae and EOM are normal. No scleral icterus.   Neck: Symmetrical. Normal range of motion. Neck supple. No tracheal deviation present.   Cardiovascular: Normal rate, no rhythm abnormality noted. Extremities without swelling or edema, warm.    Pulmonary/Chest: Normal respiratory Effort. No distress or retractions. She exhibits no tenderness.  Breasts: are symmetrical.   Right breast exhibits no inverted nipple, no mass, no nipple discharge, no skin change and no tenderness.   Left breast exhibits no inverted nipple, no mass, no nipple discharge, no skin change and no tenderness.  Abdominal: Soft. She exhibits no distension, hernias or masses. There is no tenderness. No enlargement of liver edge or spleen.  There is no rebound and no guarding.   Genitourinary:    External rectal exam shows no thrombosed external hemorrhoids, no lesions.     Pelvic exam was performed with patient supine.   No labial fusion, and symmetrical.    There is no rash, lesion or injury on the right labia.   There is no rash, lesion or injury on the left labia.   No bleeding and no signs of injury around the vaginal introitus, urethral meatus is normal size and without prolapse or lesions, urethra well supported. The cervix is visualized with no discharge, lesions or friability.   No vaginal discharge found.   No significant Cystocele, Enterocele or rectocele, and cervix and uterus well supported.   Bimanual exam:   The urethra is normal to palpation and there are no palpable vaginal wall masses.   Uterus is not deviated, not enlarged, not  fixed, normal shape and not tender.   Cervix exhibits no motion tenderness.    Right adnexum displays no mass or nodularity and no tenderness.   Left adnexum displays no mass or nodularity and no tenderness.  Musculoskeletal: Normal range of motion.   Neurological: She is alert and oriented to person, place, and time. Coordination normal.   Skin: Skin is warm and dry. She is not diaphoretic. No rashes, lesions or ulcers.   Psychiatric: She has a normal mood and affect, oriented to person, place, and time.      Assessment:   Normal annual GYN exam  Llq pains on and off, counseled     Plan:   PAP  Mammogram recommended  U/s - will call w results  Follow up in 1 year.  Patient informed will be contacted with results within 2 weeks. Encouraged to please call back or email if she has not heard from us by then.         [1]   Current Outpatient Medications on File Prior to Visit   Medication Sig Dispense Refill    b complex vitamins capsule Take 1 capsule by mouth once daily.      multivit with min-folic acid 80 mcg Chew Take 1 tablet by mouth once daily.      mupirocin (BACTROBAN) 2 % ointment Apply topically 3 (three) times daily. 30 g 0    omega-3 fatty acids/fish oil (FISH OIL-OMEGA-3 FATTY ACIDS) 300-1,000 mg capsule Take by mouth once daily.      triamcinolone acetonide 0.5% (KENALOG) 0.5 % Crea Apply topically 2 (two) times daily. 30 g 3    vitamin D (VITAMIN D3) 1000 units Tab Take 1,000 Units by mouth once daily.      zinc gluconate 50 mg tablet Take 50 mg by mouth once daily.      cetirizine (ZYRTEC) 10 MG tablet Take 1 tablet (10 mg total) by mouth 2 (two) times daily as needed (itching). 10 tablet 0     No current facility-administered medications on file prior to visit.   [2]   Social History  Socioeconomic History    Marital status:    Tobacco Use    Smoking status: Never    Smokeless tobacco: Never   Substance and Sexual Activity    Alcohol use: No     Comment: rarely    Drug use: No    Sexual  activity: Yes     Partners: Male     Birth control/protection: Partner-Vasectomy     Social Drivers of Health     Financial Resource Strain: Low Risk  (1/29/2024)    Overall Financial Resource Strain (CARDIA)     Difficulty of Paying Living Expenses: Not very hard   Food Insecurity: No Food Insecurity (1/29/2024)    Hunger Vital Sign     Worried About Running Out of Food in the Last Year: Never true     Ran Out of Food in the Last Year: Never true   Transportation Needs: No Transportation Needs (1/29/2024)    PRAPARE - Transportation     Lack of Transportation (Medical): No     Lack of Transportation (Non-Medical): No   Physical Activity: Sufficiently Active (1/29/2024)    Exercise Vital Sign     Days of Exercise per Week: 5 days     Minutes of Exercise per Session: 60 min   Stress: No Stress Concern Present (1/29/2024)    [employer] [place] [employer] - Occupational Stress Questionnaire     Feeling of Stress : Only a little   Housing Stability: Low Risk  (1/29/2024)    Housing Stability Vital Sign     Unable to Pay for Housing in the Last Year: No     Number of Places Lived in the Last Year: 1     Unstable Housing in the Last Year: No

## 2025-05-05 ENCOUNTER — HOSPITAL ENCOUNTER (OUTPATIENT)
Dept: RADIOLOGY | Facility: HOSPITAL | Age: 40
Discharge: HOME | End: 2025-05-05
Attending: OBSTETRICS & GYNECOLOGY
Payer: MEDICAID

## 2025-05-05 ENCOUNTER — PATIENT MESSAGE (OUTPATIENT)
Dept: OBSTETRICS AND GYNECOLOGY | Facility: CLINIC | Age: 40
End: 2025-05-05
Payer: MEDICAID

## 2025-05-05 DIAGNOSIS — R10.32 LLQ PAIN: ICD-10-CM

## 2025-05-05 PROCEDURE — 76856 US EXAM PELVIC COMPLETE: CPT | Mod: 26,,, | Performed by: RADIOLOGY

## 2025-05-05 PROCEDURE — 76830 TRANSVAGINAL US NON-OB: CPT | Mod: TC,PN

## 2025-05-05 PROCEDURE — 76830 TRANSVAGINAL US NON-OB: CPT | Mod: 26,,, | Performed by: RADIOLOGY

## 2025-05-06 ENCOUNTER — PATIENT MESSAGE (OUTPATIENT)
Dept: OBSTETRICS AND GYNECOLOGY | Facility: CLINIC | Age: 40
End: 2025-05-06
Payer: MEDICAID

## 2025-05-26 ENCOUNTER — RESULTS FOLLOW-UP (OUTPATIENT)
Dept: FAMILY MEDICINE | Facility: CLINIC | Age: 40
End: 2025-05-26

## 2025-05-26 ENCOUNTER — HOSPITAL ENCOUNTER (OUTPATIENT)
Dept: RADIOLOGY | Facility: HOSPITAL | Age: 40
Discharge: HOME | End: 2025-05-26
Attending: FAMILY MEDICINE
Payer: MEDICAID

## 2025-05-26 ENCOUNTER — OFFICE VISIT (OUTPATIENT)
Dept: FAMILY MEDICINE | Facility: CLINIC | Age: 40
End: 2025-05-26
Payer: MEDICAID

## 2025-05-26 VITALS
HEIGHT: 62 IN | SYSTOLIC BLOOD PRESSURE: 100 MMHG | DIASTOLIC BLOOD PRESSURE: 70 MMHG | BODY MASS INDEX: 22.11 KG/M2 | WEIGHT: 120.13 LBS | OXYGEN SATURATION: 98 % | HEART RATE: 81 BPM

## 2025-05-26 DIAGNOSIS — N83.209 CYST OF OVARY, UNSPECIFIED LATERALITY: ICD-10-CM

## 2025-05-26 DIAGNOSIS — R10.30 LOWER ABDOMINAL PAIN: ICD-10-CM

## 2025-05-26 DIAGNOSIS — R10.30 LOWER ABDOMINAL PAIN: Primary | ICD-10-CM

## 2025-05-26 LAB
BILIRUB SERPL-MCNC: NEGATIVE MG/DL
BLOOD URINE, POC: NORMAL
CLARITY, POC UA: CLEAR
COLOR, POC UA: YELLOW
GLUCOSE UR QL STRIP: NEGATIVE
KETONES UR QL STRIP: NEGATIVE
LEUKOCYTE ESTERASE URINE, POC: NEGATIVE
NITRITE, POC UA: NEGATIVE
PH, POC UA: 6
PROTEIN, POC: NEGATIVE
SPECIFIC GRAVITY, POC UA: 1.02
UROBILINOGEN, POC UA: 0.2

## 2025-05-26 PROCEDURE — 3078F DIAST BP <80 MM HG: CPT | Mod: CPTII,,, | Performed by: FAMILY MEDICINE

## 2025-05-26 PROCEDURE — 76830 TRANSVAGINAL US NON-OB: CPT | Mod: 26,,, | Performed by: STUDENT IN AN ORGANIZED HEALTH CARE EDUCATION/TRAINING PROGRAM

## 2025-05-26 PROCEDURE — 99213 OFFICE O/P EST LOW 20 MIN: CPT | Mod: S$PBB,,, | Performed by: FAMILY MEDICINE

## 2025-05-26 PROCEDURE — 3008F BODY MASS INDEX DOCD: CPT | Mod: CPTII,,, | Performed by: FAMILY MEDICINE

## 2025-05-26 PROCEDURE — 99999PBSHW POCT URINE DIPSTICK WITHOUT MICROSCOPE: Mod: PBBFAC,,,

## 2025-05-26 PROCEDURE — 3074F SYST BP LT 130 MM HG: CPT | Mod: CPTII,,, | Performed by: FAMILY MEDICINE

## 2025-05-26 PROCEDURE — 76856 US EXAM PELVIC COMPLETE: CPT | Mod: 26,,, | Performed by: STUDENT IN AN ORGANIZED HEALTH CARE EDUCATION/TRAINING PROGRAM

## 2025-05-26 PROCEDURE — 99214 OFFICE O/P EST MOD 30 MIN: CPT | Mod: PBBFAC,25,PN | Performed by: FAMILY MEDICINE

## 2025-05-26 PROCEDURE — 76830 TRANSVAGINAL US NON-OB: CPT | Mod: TC,PO

## 2025-05-26 PROCEDURE — 1159F MED LIST DOCD IN RCRD: CPT | Mod: CPTII,,, | Performed by: FAMILY MEDICINE

## 2025-05-26 PROCEDURE — 81002 URINALYSIS NONAUTO W/O SCOPE: CPT | Mod: PBBFAC,PN | Performed by: FAMILY MEDICINE

## 2025-05-26 PROCEDURE — 99999 PR PBB SHADOW E&M-EST. PATIENT-LVL IV: CPT | Mod: PBBFAC,,, | Performed by: FAMILY MEDICINE

## 2025-05-26 PROCEDURE — 3044F HG A1C LEVEL LT 7.0%: CPT | Mod: CPTII,,, | Performed by: FAMILY MEDICINE

## 2025-05-26 PROCEDURE — 1160F RVW MEDS BY RX/DR IN RCRD: CPT | Mod: CPTII,,, | Performed by: FAMILY MEDICINE

## 2025-05-26 RX ORDER — CELECOXIB 100 MG/1
100 CAPSULE ORAL 2 TIMES DAILY
Qty: 60 CAPSULE | Refills: 0 | Status: SHIPPED | OUTPATIENT
Start: 2025-05-26 | End: 2025-05-27 | Stop reason: CLARIF

## 2025-05-26 NOTE — PROGRESS NOTES
Chief Complaint:  Chief Complaint   Patient presents with    Follow-up     Hospital visit may 8 left abdominal pain has had us just inflammed burning and pain hard to sleep and getting bigger.        HPI:  Stefanie is a 40 y.o. year old     History of Present Illness    CHIEF COMPLAINT:  Pat presents today for follow up of left lower quadrant pain and tenderness.    HISTORY OF PRESENT ILLNESS:  She reports left lower quadrant pain that initially started in February, initially subsiding within an hour of onset. Symptoms have recently worsened with development of internal heat sensation and pulsating feeling. She experienced a popping sensation followed by bleeding. Current pain is rated 4/10 with throbbing and sharp characteristics. She also reports new onset bloating and umbilical pain. Sleep is disturbed due to increased pain. She experiences difficulty breathing when supine.    GASTROINTESTINAL:  She experiences intermittent gas and diarrhea associated with pulsating abdominal pain.    GYNECOLOGIC:  She reports normal menstrual periods since February. Period was due during the week of ultrasound according to her menstrual calendar.    LABS:  CBC showed normal hemoglobin, white count, and platelets. Chemistry panel revealed normal liver, kidney, and blood sugar. Pancreatic enzyme was normal. Thyroid function tests from earlier this year were normal.    ALLERGIES:  History of allergic reaction to ant bite on foot in January requiring steroid treatment.      ROS:  General: +chills, +sleep disturbances  Cardiovascular: +palpitations, +feelings of fast heart rate  Respiratory: +shortness of breath lying down, +difficulty breathing  Gastrointestinal: +abdominal pain, +diarrhea, +bloating  Psychiatric: +anxiety  Female Genitourinary: +excessive vaginal bleeding                 Past Medical History:  Past Medical History:   Diagnosis Date    Gestational diabetes     Ovarian cyst          Vitals:  Vitals:    05/26/25 1013  "  BP: 100/70   Pulse: 81   SpO2: 98%   Weight: 54.5 kg (120 lb 2.4 oz)   Height: 5' 2" (1.575 m)   PainSc:   4       BP Readings from Last 5 Encounters:   05/26/25 100/70   05/08/25 (!) 116/53   04/28/25 120/80   02/10/25 100/70   01/29/24 104/70       The 10-year ASCVD risk score (Raine MENESES, et al., 2019) is: 0.2%    Values used to calculate the score:      Age: 40 years      Sex: Female      Is Non- : No      Diabetic: No      Tobacco smoker: No      Systolic Blood Pressure: 100 mmHg      Is BP treated: No      HDL Cholesterol: 70 mg/dL      Total Cholesterol: 167 mg/dL      Physical Exam:  Physical Exam  Vitals and nursing note reviewed.   Constitutional:       General: She is not in acute distress.     Appearance: Normal appearance. She is well-developed.   HENT:      Head: Normocephalic and atraumatic.   Eyes:      General: No scleral icterus.        Right eye: No discharge.         Left eye: No discharge.      Conjunctiva/sclera: Conjunctivae normal.   Cardiovascular:      Rate and Rhythm: Normal rate and regular rhythm.   Pulmonary:      Effort: Pulmonary effort is normal. No respiratory distress.      Breath sounds: Normal breath sounds.   Abdominal:      Palpations: Abdomen is soft.      Tenderness: There is abdominal tenderness in the periumbilical area and left lower quadrant. There is no guarding.   Musculoskeletal:         General: No deformity or signs of injury.      Cervical back: Neck supple.   Skin:     General: Skin is warm and dry.      Findings: No rash.   Neurological:      General: No focal deficit present.      Mental Status: She is alert and oriented to person, place, and time.   Psychiatric:         Mood and Affect: Mood normal.         Behavior: Behavior normal.             Assessment & Plan:  Lower abdominal pain  -     POCT urine dipstick without microscope  -     Cancel: US Pelvis Complete Non OB; Future; Expected date: 05/26/2025  -     celecoxib (CELEBREX) 100 " MG capsule; Take 1 capsule (100 mg total) by mouth 2 (two) times daily.  Dispense: 60 capsule; Refill: 0    Cyst of ovary, unspecified laterality         Assessment & Plan    ORDERS:   Ordered pelvic US (both external and transvaginal) to be completed before June 2nd to compare with previous findings and guide further treatment.    IMPRESSION:  Reviewed CT Abdomen results showing inflammation but no major findings.  Evaluated ruptured follicle or cyst potentially causing residual bleeding and irritation.  Assessed effectiveness of current pain management strategies.  Considered potential link between recent prednisone use and current symptoms.    PLAN SUMMARY:   Ordered pelvic US (external and transvaginal) before June 2nd   Started Celebrex (anti-inflammatory) twice daily with food and water   Decreased muscle relaxant to half or quarter dose, taken at 8:30 or 9:00 PM if needed for sleep   Try ice therapy for inflammation   Limit heat therapy on abdomen to 20 minutes at a time   Use pillow under knees when sleeping on back, consider pillow under shoulder for slight elevation   Avoid abdominal exercises (sit-ups, leg lifts, squats, etc.)   Contact office if symptoms worsen or new concerns arise   Follow up with Dr. Hernandez on June 2nd as scheduled    PATIENT EDUCATION:   Explained normal process of follicle/cyst rupture in women and potential for residual bleeding.   Discussed inflammation as cause of current symptoms and discomfort.   Clarified that nerves affected do not extend to chest area.   Explained gradual nature of anti-inflammatory medication effects.    ACTION ITEMS/LIFESTYLE:   Pat to avoid abdominal exercises, including sit-ups, leg lifts, squats, and other exercises requiring abdominal muscle engagement.   Pat to use pillow under knees when sleeping on back and consider placing pillow under shoulder for slight elevation during sleep.   Recommend limiting use of heat therapy on abdomen to 20 minutes at a  time.   Pat to try ice therapy as anti-inflammatory medication takes effect.    MEDICATIONS:   Started Celebrex (anti-inflammatory) twice daily with food and plenty of water.   Decreased muscle relaxant to half or quarter dose, taken at 8:30 or 9:00 PM if needed for sleep.    FOLLOW UP:   Follow up on June 2nd as scheduled with Dr. Hernandez.   Contact office if symptoms worsen or new concerns arise.         This note was generated with the assistance of ambient listening technology. Verbal consent was obtained by the patient and accompanying visitor(s) for the recording of patient appointment to facilitate this note. I attest to having reviewed and edited the generated note for accuracy, though some syntax or spelling errors may persist. Please contact the author of this note for any clarification.

## 2025-05-27 RX ORDER — DICLOFENAC SODIUM 75 MG/1
75 TABLET, DELAYED RELEASE ORAL 2 TIMES DAILY
Qty: 30 TABLET | Refills: 0 | Status: SHIPPED | OUTPATIENT
Start: 2025-05-27

## 2025-05-27 NOTE — PROGRESS NOTES
Please review. Medicaid does not cover celecoxib(Celebrex). Would you like us to try and get PA or is there an alternative?

## 2025-06-02 ENCOUNTER — OFFICE VISIT (OUTPATIENT)
Dept: OBSTETRICS AND GYNECOLOGY | Facility: CLINIC | Age: 40
End: 2025-06-02
Payer: MEDICAID

## 2025-06-02 VITALS
BODY MASS INDEX: 21.98 KG/M2 | DIASTOLIC BLOOD PRESSURE: 78 MMHG | WEIGHT: 120.13 LBS | SYSTOLIC BLOOD PRESSURE: 116 MMHG

## 2025-06-02 DIAGNOSIS — R10.32 LLQ PAIN: Primary | ICD-10-CM

## 2025-06-02 PROCEDURE — 99999 PR PBB SHADOW E&M-EST. PATIENT-LVL III: CPT | Mod: PBBFAC,,, | Performed by: OBSTETRICS & GYNECOLOGY

## 2025-06-02 PROCEDURE — 3078F DIAST BP <80 MM HG: CPT | Mod: CPTII,,, | Performed by: OBSTETRICS & GYNECOLOGY

## 2025-06-02 PROCEDURE — 3044F HG A1C LEVEL LT 7.0%: CPT | Mod: CPTII,,, | Performed by: OBSTETRICS & GYNECOLOGY

## 2025-06-02 PROCEDURE — 1159F MED LIST DOCD IN RCRD: CPT | Mod: CPTII,,, | Performed by: OBSTETRICS & GYNECOLOGY

## 2025-06-02 PROCEDURE — 99213 OFFICE O/P EST LOW 20 MIN: CPT | Mod: S$PBB,,, | Performed by: OBSTETRICS & GYNECOLOGY

## 2025-06-02 PROCEDURE — 3008F BODY MASS INDEX DOCD: CPT | Mod: CPTII,,, | Performed by: OBSTETRICS & GYNECOLOGY

## 2025-06-02 PROCEDURE — 99213 OFFICE O/P EST LOW 20 MIN: CPT | Mod: PBBFAC,PN | Performed by: OBSTETRICS & GYNECOLOGY

## 2025-06-02 PROCEDURE — 3074F SYST BP LT 130 MM HG: CPT | Mod: CPTII,,, | Performed by: OBSTETRICS & GYNECOLOGY

## 2025-06-05 ENCOUNTER — PATIENT MESSAGE (OUTPATIENT)
Dept: FAMILY MEDICINE | Facility: CLINIC | Age: 40
End: 2025-06-05
Payer: MEDICAID

## 2025-06-05 DIAGNOSIS — R10.32 LEFT LOWER QUADRANT ABDOMINAL PAIN: Primary | ICD-10-CM

## 2025-06-05 RX ORDER — DICYCLOMINE HYDROCHLORIDE 10 MG/1
10 CAPSULE ORAL 2 TIMES DAILY
Qty: 60 CAPSULE | Refills: 0 | Status: SHIPPED | OUTPATIENT
Start: 2025-06-05 | End: 2025-06-06

## 2025-06-06 RX ORDER — DICYCLOMINE HYDROCHLORIDE 10 MG/1
CAPSULE ORAL
Qty: 60 CAPSULE | Refills: 1 | Status: SHIPPED | OUTPATIENT
Start: 2025-06-06

## 2025-06-06 RX ORDER — DICYCLOMINE HYDROCHLORIDE 10 MG/1
CAPSULE ORAL
Qty: 180 CAPSULE | OUTPATIENT
Start: 2025-06-06

## 2025-06-16 ENCOUNTER — PATIENT MESSAGE (OUTPATIENT)
Dept: FAMILY MEDICINE | Facility: CLINIC | Age: 40
End: 2025-06-16
Payer: MEDICAID

## 2025-06-16 ENCOUNTER — LAB VISIT (OUTPATIENT)
Dept: LAB | Facility: HOSPITAL | Age: 40
End: 2025-06-16
Payer: MEDICAID

## 2025-06-16 DIAGNOSIS — R10.32 LEFT LOWER QUADRANT ABDOMINAL PAIN: ICD-10-CM

## 2025-06-16 PROCEDURE — 89055 LEUKOCYTE ASSESSMENT FECAL: CPT

## 2025-06-16 PROCEDURE — 87324 CLOSTRIDIUM AG IA: CPT

## 2025-06-16 PROCEDURE — 87046 STOOL CULTR AEROBIC BACT EA: CPT

## 2025-06-16 PROCEDURE — 87427 SHIGA-LIKE TOXIN AG IA: CPT | Mod: 59

## 2025-06-16 PROCEDURE — 83993 ASSAY FOR CALPROTECTIN FECAL: CPT

## 2025-06-16 PROCEDURE — 82272 OCCULT BLD FECES 1-3 TESTS: CPT

## 2025-06-16 PROCEDURE — 87328 CRYPTOSPORIDIUM AG IA: CPT

## 2025-06-16 PROCEDURE — 82653 EL-1 FECAL QUANTITATIVE: CPT

## 2025-06-16 PROCEDURE — 82705 FATS/LIPIDS FECES QUAL: CPT

## 2025-06-16 PROCEDURE — 87177 OVA AND PARASITES SMEARS: CPT

## 2025-06-16 PROCEDURE — 87046 STOOL CULTR AEROBIC BACT EA: CPT | Mod: 59

## 2025-06-16 PROCEDURE — 87338 HPYLORI STOOL AG IA: CPT

## 2025-06-16 NOTE — TELEPHONE ENCOUNTER
"Noted that the diflofenac didn't work.   Ok to use otc (advil or aleve) if needed for current sx.  For the wrist, a "thumb spica" brace available online or in some stores (ie [employer], etc) can help if needed.  Stool studies in process. Some results will take up to a week to return.  Hopefully, the pain will improve, let me know if worsening. We could try a consult to pain mgmt for any additional recommendations.   "

## 2025-06-17 ENCOUNTER — RESULTS FOLLOW-UP (OUTPATIENT)
Dept: FAMILY MEDICINE | Facility: CLINIC | Age: 40
End: 2025-06-17

## 2025-06-17 LAB
C COLI+JEJ+UPSA DNA STL QL NAA+NON-PROBE: NEGATIVE
C DIFF GDH STL QL: NEGATIVE
C DIFF TOX A+B STL QL IA: NEGATIVE
CALPROTECTIN INTERP (OHS): NORMAL
CALPROTECTIN STOOL (OHS): <5 ΜG/G
CRYPTOSP AG SPEC QL: NEGATIVE
ELASTASE, STOOL INTERPRETATION (OHS): NORMAL
G LAMBLIA AG STL QL IA: NEGATIVE
H. PYLORI SURFACE ANTIGEN, INTERPRETATION (OHS): NEGATIVE
HELICOBACTER PYLORI SURFACE ANTIGEN (OHS): 0.15
OB PNL STL: NEGATIVE
PANCREATIC ELASTASE, FECAL (OHS): >800 ΜG/G
WBC #/AREA STL HPF: NORMAL /[HPF]

## 2025-06-19 LAB — BACTERIA STL CULT: NORMAL

## 2025-06-20 LAB
E COLI SXT1 STL QL IA: NEGATIVE
E COLI SXT2 STL QL IA: NEGATIVE
FAT STL QL: NORMAL
NEUTRAL FAT STL QL: NORMAL

## 2025-06-21 LAB — O+P STL MICRO: NORMAL

## 2025-08-07 ENCOUNTER — PATIENT MESSAGE (OUTPATIENT)
Dept: FAMILY MEDICINE | Facility: CLINIC | Age: 40
End: 2025-08-07
Payer: MEDICAID

## 2025-08-07 NOTE — TELEPHONE ENCOUNTER
Please review and advise if this could be ovarian cyst related and if she should f/u w/ Dr Contreras about this

## 2025-08-07 NOTE — TELEPHONE ENCOUNTER
If still symptomatic in the area of the cyst, Dr Contreras had several recommendations that he noted in the June visit - trying ocps, discussing laparoscopic cyst removal - he wanted to hear back from her after 2mos if there were any continued symptoms.   I'm glad to hear that the bowel issues have normalized. If they recur, I would definitely want her to review with GI.

## 2025-08-08 ENCOUNTER — PATIENT MESSAGE (OUTPATIENT)
Dept: OBSTETRICS AND GYNECOLOGY | Facility: CLINIC | Age: 40
End: 2025-08-08
Payer: MEDICAID